# Patient Record
Sex: FEMALE | Race: WHITE | NOT HISPANIC OR LATINO | Employment: OTHER | URBAN - METROPOLITAN AREA
[De-identification: names, ages, dates, MRNs, and addresses within clinical notes are randomized per-mention and may not be internally consistent; named-entity substitution may affect disease eponyms.]

---

## 2023-05-02 ENCOUNTER — TELEPHONE (OUTPATIENT)
Dept: PRIMARY CARE | Facility: CLINIC | Age: 73
End: 2023-05-02
Payer: MEDICARE

## 2023-05-03 ENCOUNTER — TELEPHONE (OUTPATIENT)
Dept: PRIMARY CARE | Facility: CLINIC | Age: 73
End: 2023-05-03
Payer: MEDICARE

## 2023-05-03 NOTE — TELEPHONE ENCOUNTER
Called again to discussed results with patient  Reassured about her coronary artery calcium score being 0 and no other significant findings other than some mitral annular calcification which can just be followed in office with cardiac auscultation and she should be mindful of any potential increased shortness of breath with activity  Also, her bone density test did show improvement when compared to her last test, but still behind compared to her baseline  However if she is trending in the right direction on her alendronate and tolerating that well  Would encourage her to continue as doing and we will plan to recheck in another 2 years  Also mammogram is normal  Also she asked us to keep an eye out for the Pap test she had done by GYN  -I sent a reminder

## 2023-06-02 ENCOUNTER — TELEPHONE (OUTPATIENT)
Dept: PRIMARY CARE | Facility: CLINIC | Age: 73
End: 2023-06-02
Payer: MEDICARE

## 2023-06-02 DIAGNOSIS — H10.9 CONJUNCTIVITIS, UNSPECIFIED CONJUNCTIVITIS TYPE, UNSPECIFIED LATERALITY: Primary | ICD-10-CM

## 2023-06-02 RX ORDER — PREDNISOLONE ACETATE 10 MG/ML
1 SUSPENSION/ DROPS OPHTHALMIC 4 TIMES DAILY
Qty: 5 ML | Refills: 0 | Status: SHIPPED | OUTPATIENT
Start: 2023-06-02 | End: 2023-06-09

## 2023-06-02 RX ORDER — TOBRAMYCIN 3 MG/ML
2 SOLUTION/ DROPS OPHTHALMIC 4 TIMES DAILY
Qty: 5 ML | Refills: 0 | Status: SHIPPED | OUTPATIENT
Start: 2023-06-02 | End: 2023-06-09

## 2023-06-02 NOTE — TELEPHONE ENCOUNTER
Called and discussed with patient  She had sent email images of right eye irritation  Denies any fever chills or any other upper respiratory symptoms of note  She has not tried putting anything in the eye, denies any trauma to the  Was around her grandchildren who have some upper respiratory symptoms  Given the story and current condition, will try tobramycin drops along with steroid  She denies any history of increased intraocular pressure or glaucoma  If worsening symptoms through the weekend to update us, also if starts to get symptoms into the left eye to go ahead and treat that similarly  Also noted she does not need to take a full 7-day course nor does she need to add the prednisolone unless she really is feeling some discomfort/irritation    Requested Prescriptions     Signed Prescriptions Disp Refills    tobramycin (Tobrex) 0.3 % ophthalmic solution 5 mL 0     Sig: Administer 2 drops into the right eye 4 times a day for 7 days. May use with prednisolone     Authorizing Provider: MITCHELL SARKAR    prednisoLONE acetate (Pred Forte) 1 % ophthalmic suspension 5 mL 0     Sig: Administer 1 drop into the right eye 4 times a day for 7 days. May use with Tobramycin     Authorizing Provider: MITCHELL SARKAR

## 2023-07-03 ENCOUNTER — TELEPHONE (OUTPATIENT)
Dept: PRIMARY CARE | Facility: CLINIC | Age: 73
End: 2023-07-03
Payer: MEDICARE

## 2023-07-03 NOTE — TELEPHONE ENCOUNTER
Patient had asked us to keep an eye out fro her Pap/HPV testing through Gyn office - wasn't able to easily view until now - did have positive HPV - sent the following email...    Nito Borden - I hope this finds you enjoying summer.  In short, I had a note to myself to keep an eye out for your Pap and HPV testing done in late April. I had checked a few times, but finally I was able to access it. I assume you had ultimately heard about it from Dr Lopez, but let me know if you hadn't.  Best regards - TP

## 2023-07-04 NOTE — TELEPHONE ENCOUNTER
Patient emailed me after getting direction from her GYN to do colposcopy at her next visit and spring of next year  She does not have any symptoms and her Pap test was normal  I am not aware of any new guidance in that area nor that there is clear benefit of getting an HPV vaccine to thwart any potential complications from the presence of the virus  Encouraged her to reach out if any questions or concerns in the interim

## 2023-12-15 DIAGNOSIS — Z00.00 HEALTHCARE MAINTENANCE: ICD-10-CM

## 2023-12-20 DIAGNOSIS — Z00.00 HEALTHCARE MAINTENANCE: ICD-10-CM

## 2024-01-06 ASSESSMENT — PROMIS GLOBAL HEALTH SCALE
RATE_PHYSICAL_HEALTH: VERY GOOD
EMOTIONAL_PROBLEMS: RARELY
RATE_AVERAGE_PAIN: 2
RATE_AVERAGE_FATIGUE: MILD
CARRYOUT_PHYSICAL_ACTIVITIES: COMPLETELY
RATE_QUALITY_OF_LIFE: VERY GOOD
RATE_GENERAL_HEALTH: VERY GOOD
RATE_MENTAL_HEALTH: VERY GOOD
RATE_SOCIAL_SATISFACTION: VERY GOOD
CARRYOUT_SOCIAL_ACTIVITIES: EXCELLENT

## 2024-01-08 ENCOUNTER — LAB (OUTPATIENT)
Dept: LAB | Facility: LAB | Age: 74
End: 2024-01-08
Payer: MEDICARE

## 2024-01-08 DIAGNOSIS — Z00.00 HEALTHCARE MAINTENANCE: ICD-10-CM

## 2024-01-08 LAB
25(OH)D3 SERPL-MCNC: 93 NG/ML (ref 30–100)
ALBUMIN SERPL BCP-MCNC: 4.2 G/DL (ref 3.4–5)
ALP SERPL-CCNC: 42 U/L (ref 33–136)
ALT SERPL W P-5'-P-CCNC: 8 U/L (ref 7–45)
ANION GAP SERPL CALC-SCNC: 13 MMOL/L (ref 10–20)
APPEARANCE UR: CLEAR
AST SERPL W P-5'-P-CCNC: 18 U/L (ref 9–39)
BASOPHILS # BLD AUTO: 0.05 X10*3/UL (ref 0–0.1)
BASOPHILS NFR BLD AUTO: 0.7 %
BILIRUB SERPL-MCNC: 0.7 MG/DL (ref 0–1.2)
BILIRUB UR STRIP.AUTO-MCNC: NEGATIVE MG/DL
BUN SERPL-MCNC: 17 MG/DL (ref 6–23)
CALCIUM SERPL-MCNC: 9.5 MG/DL (ref 8.6–10.6)
CHLORIDE SERPL-SCNC: 104 MMOL/L (ref 98–107)
CHOLEST SERPL-MCNC: 226 MG/DL (ref 0–199)
CHOLESTEROL/HDL RATIO: 2.7
CO2 SERPL-SCNC: 29 MMOL/L (ref 21–32)
COLOR UR: YELLOW
CREAT SERPL-MCNC: 0.72 MG/DL (ref 0.5–1.05)
CRP SERPL HS-MCNC: 0.7 MG/L
EGFRCR SERPLBLD CKD-EPI 2021: 87 ML/MIN/1.73M*2
EOSINOPHIL # BLD AUTO: 0.13 X10*3/UL (ref 0–0.4)
EOSINOPHIL NFR BLD AUTO: 1.9 %
ERYTHROCYTE [DISTWIDTH] IN BLOOD BY AUTOMATED COUNT: 13.1 % (ref 11.5–14.5)
EST. AVERAGE GLUCOSE BLD GHB EST-MCNC: 105 MG/DL
GLUCOSE SERPL-MCNC: 84 MG/DL (ref 74–99)
GLUCOSE UR STRIP.AUTO-MCNC: NEGATIVE MG/DL
HBA1C MFR BLD: 5.3 %
HCT VFR BLD AUTO: 42 % (ref 36–46)
HDLC SERPL-MCNC: 84.3 MG/DL
HGB BLD-MCNC: 13.9 G/DL (ref 12–16)
IMM GRANULOCYTES # BLD AUTO: 0.02 X10*3/UL (ref 0–0.5)
IMM GRANULOCYTES NFR BLD AUTO: 0.3 % (ref 0–0.9)
KETONES UR STRIP.AUTO-MCNC: NEGATIVE MG/DL
LDLC SERPL CALC-MCNC: 124 MG/DL
LEUKOCYTE ESTERASE UR QL STRIP.AUTO: NEGATIVE
LYMPHOCYTES # BLD AUTO: 1.91 X10*3/UL (ref 0.8–3)
LYMPHOCYTES NFR BLD AUTO: 28.4 %
MCH RBC QN AUTO: 31 PG (ref 26–34)
MCHC RBC AUTO-ENTMCNC: 33.1 G/DL (ref 32–36)
MCV RBC AUTO: 94 FL (ref 80–100)
MONOCYTES # BLD AUTO: 0.52 X10*3/UL (ref 0.05–0.8)
MONOCYTES NFR BLD AUTO: 7.7 %
NEUTROPHILS # BLD AUTO: 4.09 X10*3/UL (ref 1.6–5.5)
NEUTROPHILS NFR BLD AUTO: 61 %
NITRITE UR QL STRIP.AUTO: NEGATIVE
NON HDL CHOLESTEROL: 142 MG/DL (ref 0–149)
NRBC BLD-RTO: 0 /100 WBCS (ref 0–0)
PH UR STRIP.AUTO: 5 [PH]
PLATELET # BLD AUTO: 249 X10*3/UL (ref 150–450)
POTASSIUM SERPL-SCNC: 4 MMOL/L (ref 3.5–5.3)
PROT SERPL-MCNC: 6.2 G/DL (ref 6.4–8.2)
PROT UR STRIP.AUTO-MCNC: NEGATIVE MG/DL
RBC # BLD AUTO: 4.48 X10*6/UL (ref 4–5.2)
RBC # UR STRIP.AUTO: NEGATIVE /UL
SODIUM SERPL-SCNC: 142 MMOL/L (ref 136–145)
SP GR UR STRIP.AUTO: 1.02
TRIGL SERPL-MCNC: 87 MG/DL (ref 0–149)
TSH SERPL-ACNC: 2.58 MIU/L (ref 0.44–3.98)
UROBILINOGEN UR STRIP.AUTO-MCNC: <2 MG/DL
VLDL: 17 MG/DL (ref 0–40)
WBC # BLD AUTO: 6.7 X10*3/UL (ref 4.4–11.3)

## 2024-01-08 PROCEDURE — 80053 COMPREHEN METABOLIC PANEL: CPT

## 2024-01-08 PROCEDURE — 85025 COMPLETE CBC W/AUTO DIFF WBC: CPT

## 2024-01-08 PROCEDURE — 36415 COLL VENOUS BLD VENIPUNCTURE: CPT

## 2024-01-08 PROCEDURE — 84443 ASSAY THYROID STIM HORMONE: CPT

## 2024-01-08 PROCEDURE — 82306 VITAMIN D 25 HYDROXY: CPT

## 2024-01-08 PROCEDURE — 83036 HEMOGLOBIN GLYCOSYLATED A1C: CPT

## 2024-01-08 PROCEDURE — 81003 URINALYSIS AUTO W/O SCOPE: CPT

## 2024-01-08 PROCEDURE — 86141 C-REACTIVE PROTEIN HS: CPT

## 2024-01-08 PROCEDURE — 80061 LIPID PANEL: CPT

## 2024-01-11 ENCOUNTER — OFFICE VISIT (OUTPATIENT)
Dept: PRIMARY CARE | Facility: CLINIC | Age: 74
End: 2024-01-11
Payer: MEDICARE

## 2024-01-11 VITALS
WEIGHT: 98.2 LBS | TEMPERATURE: 98.9 F | SYSTOLIC BLOOD PRESSURE: 118 MMHG | HEIGHT: 59 IN | BODY MASS INDEX: 19.8 KG/M2 | HEART RATE: 84 BPM | DIASTOLIC BLOOD PRESSURE: 78 MMHG | OXYGEN SATURATION: 97 %

## 2024-01-11 DIAGNOSIS — B97.7 HIGH RISK HPV INFECTION: ICD-10-CM

## 2024-01-11 DIAGNOSIS — M81.0 OSTEOPOROSIS WITHOUT CURRENT PATHOLOGICAL FRACTURE, UNSPECIFIED OSTEOPOROSIS TYPE: ICD-10-CM

## 2024-01-11 DIAGNOSIS — Z00.01 ENCOUNTER FOR GENERAL ADULT MEDICAL EXAMINATION WITH ABNORMAL FINDINGS: Primary | ICD-10-CM

## 2024-01-11 DIAGNOSIS — E67.3 HIGH VITAMIN D LEVEL: ICD-10-CM

## 2024-01-11 DIAGNOSIS — Z12.11 COLON CANCER SCREENING: ICD-10-CM

## 2024-01-11 PROBLEM — J30.9 ALLERGIC RHINITIS: Status: ACTIVE | Noted: 2024-01-11

## 2024-01-11 PROBLEM — R87.619 ABNORMAL PAP SMEAR OF CERVIX: Status: ACTIVE | Noted: 2024-01-11

## 2024-01-11 PROBLEM — M85.80 OSTEOPENIA: Status: ACTIVE | Noted: 2024-01-11

## 2024-01-11 PROCEDURE — 1036F TOBACCO NON-USER: CPT | Performed by: FAMILY MEDICINE

## 2024-01-11 PROCEDURE — UHSPHYS PR UH SELECT PHYSICAL: Performed by: FAMILY MEDICINE

## 2024-01-11 PROCEDURE — 1126F AMNT PAIN NOTED NONE PRSNT: CPT | Performed by: FAMILY MEDICINE

## 2024-01-11 PROCEDURE — 1159F MED LIST DOCD IN RCRD: CPT | Performed by: FAMILY MEDICINE

## 2024-01-11 RX ORDER — ACETAMINOPHEN 500 MG
TABLET ORAL
COMMUNITY
Start: 2023-01-17 | End: 2024-01-11 | Stop reason: DRUGHIGH

## 2024-01-11 RX ORDER — ALENDRONATE SODIUM 70 MG/1
TABLET ORAL
COMMUNITY
End: 2024-04-29 | Stop reason: SDUPTHER

## 2024-01-11 RX ORDER — ACETAMINOPHEN 500 MG
2000 TABLET ORAL
Qty: 1 CAPSULE | Refills: 0 | Status: SHIPPED | COMMUNITY
Start: 2024-01-12 | End: 2024-04-29 | Stop reason: WASHOUT

## 2024-01-11 RX ORDER — ELECTROLYTES/DEXTROSE
SOLUTION, ORAL ORAL
COMMUNITY
Start: 2023-01-17

## 2024-01-11 RX ORDER — LORATADINE 10 MG/1
1 TABLET ORAL DAILY PRN
COMMUNITY
Start: 2023-01-17

## 2024-01-11 RX ORDER — PNV NO.95/FERROUS FUM/FOLIC AC 28MG-0.8MG
1 TABLET ORAL DAILY
COMMUNITY
Start: 2023-01-17

## 2024-01-11 ASSESSMENT — ENCOUNTER SYMPTOMS
LOSS OF SENSATION IN FEET: 0
DEPRESSION: 0
OCCASIONAL FEELINGS OF UNSTEADINESS: 0

## 2024-01-11 ASSESSMENT — PATIENT HEALTH QUESTIONNAIRE - PHQ9
2. FEELING DOWN, DEPRESSED OR HOPELESS: NOT AT ALL
SUM OF ALL RESPONSES TO PHQ9 QUESTIONS 1 & 2: 0
1. LITTLE INTEREST OR PLEASURE IN DOING THINGS: NOT AT ALL

## 2024-01-11 ASSESSMENT — LIFESTYLE VARIABLES
HOW OFTEN DO YOU HAVE A DRINK CONTAINING ALCOHOL: NEVER
HOW MANY STANDARD DRINKS CONTAINING ALCOHOL DO YOU HAVE ON A TYPICAL DAY: PATIENT DOES NOT DRINK
SKIP TO QUESTIONS 9-10: 1
AUDIT-C TOTAL SCORE: 0
HOW OFTEN DO YOU HAVE SIX OR MORE DRINKS ON ONE OCCASION: NEVER

## 2024-01-11 ASSESSMENT — PAIN SCALES - GENERAL: PAINLEVEL: 0-NO PAIN

## 2024-01-11 NOTE — PROGRESS NOTES
"Subjective   Patient ID: Suha Arellano is a 73 y.o. female who presents for Annual Exam ([facility]).  HPI  Doing well overall     1) uncertain as to what follow-up she needs for her HPV positivity with normal Pap at last visit in Spring 2023 - has had colpo in past - question if should simply have repeat HPV and Pap or other - she is not having any pelvic symptoms    2) preventive care -   Heart disease risk - CAC was 0 last year - has good TC:HDL - great BP - no DM - follow  Osteoporosis - BMD due 4/2025  Mammo - due 4/2025  Colon cancer - last 8/23/2013  Dr Bryant at [facility]     Review of Systems  Essentially noncontributory otherwise    Objective   /78 (BP Location: Left arm, Patient Position: Sitting, BP Cuff Size: Adult)   Pulse 84   Temp 37.2 °C (98.9 °F) (Temporal)   Ht 1.51 m (4' 11.45\")   Wt (!) 44.5 kg (98 lb 3.2 oz)   SpO2 97%   BMI 19.54 kg/m²     Physical Exam  Vitals and nursing note reviewed.   Constitutional:       General: She is not in acute distress.     Appearance: She is not ill-appearing.   HENT:      Head: Normocephalic and atraumatic.      Right Ear: Tympanic membrane normal.      Left Ear: Tympanic membrane normal.      Mouth/Throat:      Pharynx: No posterior oropharyngeal erythema.   Eyes:      General: No scleral icterus.     Extraocular Movements: Extraocular movements intact.      Pupils: Pupils are equal, round, and reactive to light.   Cardiovascular:      Rate and Rhythm: Normal rate and regular rhythm.      Heart sounds: No murmur heard.  Pulmonary:      Breath sounds: Normal breath sounds. No wheezing or rhonchi.   Abdominal:      General: There is no distension.      Palpations: Abdomen is soft.      Tenderness: There is no abdominal tenderness.   Musculoskeletal:         General: Normal range of motion.      Cervical back: Normal range of motion.      Right lower leg: No edema.      Left lower leg: No edema.   Lymphadenopathy:      Cervical: No cervical " adenopathy.   Skin:     General: Skin is warm and dry.   Neurological:      Mental Status: She is alert and oriented to person, place, and time. Mental status is at baseline.      Motor: No weakness.      Coordination: Coordination normal.      Deep Tendon Reflexes: Reflexes normal.   Psychiatric:         Mood and Affect: Mood normal.         Behavior: Behavior normal.         Thought Content: Thought content normal.         Judgment: Judgment normal.         Assessment/Plan   Problem List Items Addressed This Visit       High risk HPV infection    Osteoporosis     Other Visit Diagnoses       Encounter for general adult medical examination with abnormal findings    -  Primary    High vitamin D level        Colon cancer screening            1) will message Dr Lopez about follow-up    2) reduce Vit D dosing as noted - BMD next year     3) consider c-scope v Cologuard testing

## 2024-04-29 ENCOUNTER — OFFICE VISIT (OUTPATIENT)
Dept: OBSTETRICS AND GYNECOLOGY | Facility: CLINIC | Age: 74
End: 2024-04-29
Payer: MEDICARE

## 2024-04-29 VITALS
BODY MASS INDEX: 19.63 KG/M2 | WEIGHT: 100 LBS | HEIGHT: 60 IN | DIASTOLIC BLOOD PRESSURE: 77 MMHG | SYSTOLIC BLOOD PRESSURE: 154 MMHG

## 2024-04-29 DIAGNOSIS — Z12.31 BREAST CANCER SCREENING BY MAMMOGRAM: ICD-10-CM

## 2024-04-29 DIAGNOSIS — Z01.419 WELL WOMAN EXAM: Primary | ICD-10-CM

## 2024-04-29 DIAGNOSIS — M81.0 AGE-RELATED OSTEOPOROSIS WITHOUT CURRENT PATHOLOGICAL FRACTURE: ICD-10-CM

## 2024-04-29 DIAGNOSIS — Z12.4 CERVICAL CANCER SCREENING: ICD-10-CM

## 2024-04-29 DIAGNOSIS — R87.618 OTHER ABNORMAL CYTOLOGICAL FINDING OF SPECIMEN FROM CERVIX: ICD-10-CM

## 2024-04-29 PROCEDURE — 1160F RVW MEDS BY RX/DR IN RCRD: CPT | Performed by: OBSTETRICS & GYNECOLOGY

## 2024-04-29 PROCEDURE — 1126F AMNT PAIN NOTED NONE PRSNT: CPT | Performed by: OBSTETRICS & GYNECOLOGY

## 2024-04-29 PROCEDURE — 87624 HPV HI-RISK TYP POOLED RSLT: CPT

## 2024-04-29 PROCEDURE — 1159F MED LIST DOCD IN RCRD: CPT | Performed by: OBSTETRICS & GYNECOLOGY

## 2024-04-29 PROCEDURE — G0101 CA SCREEN;PELVIC/BREAST EXAM: HCPCS | Performed by: OBSTETRICS & GYNECOLOGY

## 2024-04-29 PROCEDURE — 1036F TOBACCO NON-USER: CPT | Performed by: OBSTETRICS & GYNECOLOGY

## 2024-04-29 PROCEDURE — 88175 CYTOPATH C/V AUTO FLUID REDO: CPT

## 2024-04-29 RX ORDER — ALENDRONATE SODIUM 70 MG/1
TABLET ORAL
Qty: 12 TABLET | Refills: 3 | Status: SHIPPED | OUTPATIENT
Start: 2024-04-29

## 2024-04-29 ASSESSMENT — ENCOUNTER SYMPTOMS
UNEXPECTED WEIGHT CHANGE: 0
FEVER: 0
FLANK PAIN: 0
COLOR CHANGE: 0
BACK PAIN: 0
ABDOMINAL PAIN: 0
CHILLS: 0
VOMITING: 0
SHORTNESS OF BREATH: 0
DIARRHEA: 0
FATIGUE: 0
DYSURIA: 0
CONSTIPATION: 0
NAUSEA: 0
BLOOD IN STOOL: 0
HEMATURIA: 0
ABDOMINAL DISTENTION: 0
SLEEP DISTURBANCE: 0
FREQUENCY: 0
APPETITE CHANGE: 0

## 2024-04-29 ASSESSMENT — PAIN SCALES - GENERAL: PAINLEVEL: 0-NO PAIN

## 2024-04-29 NOTE — PROGRESS NOTES
History Of Present Illness  Routine Gyn Exam  Suhaalejo Arellano here for routine WWE and 1 year follow up of abnormal pap smear   Pt is postmenopausal.  Denies spotting or bleeding.       Concerns: none .     New health issues or surgeries since last visit: denies.  Exercise:  pickle ball, walking .     Gynecologic History  Postmenopausal.  Sexually active: not currently .  Last Pap:  - NILM, HPV other+.   Last mammogram: .   Last Colonoscopy:  .   Last DEXA:  - osteopenia, history of osteoporosis and currently on alendronate.     Obstetric History  OB History    Para Term  AB Living   3         3   SAB IAB Ectopic Multiple Live Births                  # Outcome Date GA Lbr Alan/2nd Weight Sex Delivery Anes PTL Lv   3             2             1                  Review of Systems   Constitutional:  Negative for appetite change, chills, fatigue, fever and unexpected weight change.   Respiratory:  Negative for shortness of breath.    Cardiovascular:  Negative for chest pain.   Gastrointestinal:  Negative for abdominal distention, abdominal pain, blood in stool, constipation, diarrhea, nausea and vomiting.   Endocrine: Negative for cold intolerance and heat intolerance.   Genitourinary:  Negative for dyspareunia, dysuria, flank pain, frequency, genital sores, hematuria, menstrual problem, pelvic pain, urgency, vaginal bleeding, vaginal discharge and vaginal pain.   Musculoskeletal:  Negative for back pain.   Skin:  Negative for color change.   Psychiatric/Behavioral:  Negative for sleep disturbance.        /77 (BP Location: Left arm, Patient Position: Sitting)   Ht 1.524 m (5')   Wt 45.4 kg (100 lb)   LMP  (LMP Unknown)   BMI 19.53 kg/m²      Physical Exam  Constitutional:       Appearance: Normal appearance.   HENT:      Head: Normocephalic and atraumatic.   Chest:   Breasts:     Right: Normal.      Left: Normal.   Abdominal:      General: Abdomen is flat.       Palpations: Abdomen is soft.      Tenderness: There is no abdominal tenderness.   Genitourinary:     General: Normal vulva.      Vagina: Normal.      Cervix: Normal.      Uterus: Normal.       Adnexa: Right adnexa normal and left adnexa normal.   Skin:     General: Skin is warm and dry.   Neurological:      Mental Status: She is alert and oriented to person, place, and time.   Psychiatric:         Mood and Affect: Mood normal.              Assessment/Plan         Routine Well Woman Exam Today and follow up for abnormal pap smear   Discussed diet and exercise.   Reviewed routine health screenings.   Pap: pap done today   Recommend annual mammograms. Mammogram ordered and patient will schedule.                  Mady Lopez MD

## 2024-05-09 ENCOUNTER — HOSPITAL ENCOUNTER (OUTPATIENT)
Dept: RADIOLOGY | Facility: CLINIC | Age: 74
Discharge: HOME | End: 2024-05-09
Payer: MEDICARE

## 2024-05-09 VITALS — BODY MASS INDEX: 19.65 KG/M2 | HEIGHT: 60 IN | WEIGHT: 100.09 LBS

## 2024-05-09 DIAGNOSIS — Z12.31 BREAST CANCER SCREENING BY MAMMOGRAM: ICD-10-CM

## 2024-05-09 PROCEDURE — 77067 SCR MAMMO BI INCL CAD: CPT

## 2024-05-09 PROCEDURE — 77063 BREAST TOMOSYNTHESIS BI: CPT | Performed by: STUDENT IN AN ORGANIZED HEALTH CARE EDUCATION/TRAINING PROGRAM

## 2024-05-09 PROCEDURE — 77067 SCR MAMMO BI INCL CAD: CPT | Performed by: STUDENT IN AN ORGANIZED HEALTH CARE EDUCATION/TRAINING PROGRAM

## 2024-05-13 LAB
CYTOLOGY CMNT CVX/VAG CYTO-IMP: NORMAL
HPV HR 12 DNA GENITAL QL NAA+PROBE: POSITIVE
HPV HR GENOTYPES PNL CVX NAA+PROBE: POSITIVE
HPV16 DNA SPEC QL NAA+PROBE: NEGATIVE
HPV18 DNA SPEC QL NAA+PROBE: NEGATIVE
LAB AP HPV GENOTYPE QUESTION: YES
LAB AP HPV HR: NORMAL
LABORATORY COMMENT REPORT: NORMAL
PATH REPORT.TOTAL CANCER: NORMAL

## 2024-05-22 ENCOUNTER — TELEPHONE (OUTPATIENT)
Dept: OBSTETRICS AND GYNECOLOGY | Facility: CLINIC | Age: 74
End: 2024-05-22
Payer: MEDICARE

## 2024-05-22 NOTE — TELEPHONE ENCOUNTER
Called patient to discuss results  Left vm for patient to return call.    ANOOP Headley RN      ----- Message from Mady STONE MD sent at 5/22/2024  3:18 PM EDT -----  Pt continues to have HPV+ pap smear. Please notify patient.  I recommend follow up with colposcopy at her convenience. Thank you.

## 2024-05-23 ENCOUNTER — TELEPHONE (OUTPATIENT)
Dept: OBSTETRICS AND GYNECOLOGY | Facility: CLINIC | Age: 74
End: 2024-05-23
Payer: MEDICARE

## 2024-05-23 NOTE — TELEPHONE ENCOUNTER
Called patient to discuss results and schedule follow up   Identified by name and   Patient notified that pap came back with +HPV  Patient has had a colposcopy in the past and denies need for further education  Patient is out of town a the moment and would like to call back to schedule when she is back. Will call the office to schedule.  Encouraged patient to call office with any questions or concerns     ANOOP Headley RN      ----- Message from Mady STONE MD sent at 2024  3:18 PM EDT -----  Pt continues to have HPV+ pap smear. Please notify patient.  I recommend follow up with colposcopy at her convenience. Thank you.

## 2024-08-16 ENCOUNTER — APPOINTMENT (OUTPATIENT)
Dept: OBSTETRICS AND GYNECOLOGY | Facility: CLINIC | Age: 74
End: 2024-08-16
Payer: MEDICARE

## 2024-08-16 VITALS
WEIGHT: 98.6 LBS | HEIGHT: 60 IN | DIASTOLIC BLOOD PRESSURE: 62 MMHG | SYSTOLIC BLOOD PRESSURE: 110 MMHG | BODY MASS INDEX: 19.36 KG/M2

## 2024-08-16 DIAGNOSIS — R87.618 OTHER ABNORMAL CYTOLOGICAL FINDING OF SPECIMEN FROM CERVIX: Primary | ICD-10-CM

## 2024-08-16 PROCEDURE — 57456 ENDOCERV CURETTAGE W/SCOPE: CPT | Performed by: OBSTETRICS & GYNECOLOGY

## 2024-08-16 PROCEDURE — 88305 TISSUE EXAM BY PATHOLOGIST: CPT

## 2024-08-16 ASSESSMENT — PAIN SCALES - GENERAL: PAINLEVEL: 0-NO PAIN

## 2024-08-16 NOTE — PROGRESS NOTES
Patient ID: Suha Arellano is a 74 y.o. female.  Pt has several year history of HPV other+ pap smear with no evidence of dysplasia.   Last pap NILM, HPV other+    Visit Vitals  /62 (BP Location: Left arm, Patient Position: Sitting)   Ht 1.524 m (5')   Wt (!) 44.7 kg (98 lb 9.6 oz)   LMP  (LMP Unknown)   BMI 19.26 kg/m²   OB Status Postmenopausal   Smoking Status Never   BSA 1.38 m²         Colposcopy    Date/Time: 8/16/2024 9:44 AM    Performed by: Mady STONE MD  Authorized by: Mady STONE MD    Procedure location: cervix    Consent:     Patient questions answered: yes      Risks and benefits of the procedure and its alternatives discussed: yes      Procedural risks discussed:  Bleeding, infection, failure rate and repeat procedure    Consent obtained:  Verbal    Consent given by:  Patient  Universal Protocol:     A time out verifies correct patient, procedure, equipment, support staff, and site/side marked as required: yes      Patient states understanding of procedure being performed: yes      Relevant documents present and verified: yes      Test results available and properly labeled: yes    Indication:     Cervical indication(s): high-risk HPV positive    Pre-procedure:     Speculum was placed in the vagina: yes      Prep solution(s): acetic acid    Procedure:     Colposcopy with: endocervical curettage      Cervix visibility: fully visualized      SCJ visibility: not fully visualized      Cervical impression: normal/benign    Post-procedure:     Patient tolerance of procedure:  Patient tolerated the procedure well with no immediate complications      Colposcopy completed today   Results will take 5-7 days and patient aware I will follow up with them regarding results either by phone or [employer] messaging.   Pt instructed she may have some cramping and/or bleeding for the next 1-2 days. Please use  tylenol or ibuprofen as needed for discomfort.   Pt instructed to avoid vaginal intercourse for  1 night.  No intense exercise today but normal activities are acceptable.  May resume all activities tomorrow.

## 2024-08-23 LAB
LABORATORY COMMENT REPORT: NORMAL
PATH REPORT.COMMENTS IMP SPEC: NORMAL
PATH REPORT.FINAL DX SPEC: NORMAL
PATH REPORT.GROSS SPEC: NORMAL
PATH REPORT.RELEVANT HX SPEC: NORMAL
PATH REPORT.TOTAL CANCER: NORMAL

## 2024-09-05 ENCOUNTER — TELEPHONE (OUTPATIENT)
Dept: OBSTETRICS AND GYNECOLOGY | Facility: CLINIC | Age: 74
End: 2024-09-05
Payer: MEDICARE

## 2024-09-05 NOTE — TELEPHONE ENCOUNTER
patient is calling the provider back in regards to test results. In the event the call is missed you may leave a detailed message but patient would like to speak with provider instead.

## 2024-09-10 ENCOUNTER — PREP FOR PROCEDURE (OUTPATIENT)
Dept: OBSTETRICS AND GYNECOLOGY | Facility: CLINIC | Age: 74
End: 2024-09-10
Payer: MEDICARE

## 2024-09-10 DIAGNOSIS — N87.1 MODERATE DYSPLASIA OF CERVIX: Primary | ICD-10-CM

## 2024-09-10 RX ORDER — SODIUM CHLORIDE, SODIUM LACTATE, POTASSIUM CHLORIDE, CALCIUM CHLORIDE 600; 310; 30; 20 MG/100ML; MG/100ML; MG/100ML; MG/100ML
20 INJECTION, SOLUTION INTRAVENOUS CONTINUOUS
OUTPATIENT
Start: 2024-09-10

## 2024-10-10 ENCOUNTER — APPOINTMENT (OUTPATIENT)
Dept: OBSTETRICS AND GYNECOLOGY | Facility: CLINIC | Age: 74
End: 2024-10-10
Payer: MEDICARE

## 2024-10-10 ENCOUNTER — PREP FOR PROCEDURE (OUTPATIENT)
Dept: OBSTETRICS AND GYNECOLOGY | Facility: CLINIC | Age: 74
End: 2024-10-10

## 2024-10-10 VITALS
BODY MASS INDEX: 19.83 KG/M2 | DIASTOLIC BLOOD PRESSURE: 68 MMHG | SYSTOLIC BLOOD PRESSURE: 102 MMHG | HEIGHT: 60 IN | WEIGHT: 101 LBS

## 2024-10-10 DIAGNOSIS — N87.9 CERVICAL DYSPLASIA: Primary | ICD-10-CM

## 2024-10-10 PROCEDURE — 1126F AMNT PAIN NOTED NONE PRSNT: CPT | Performed by: OBSTETRICS & GYNECOLOGY

## 2024-10-10 PROCEDURE — 1160F RVW MEDS BY RX/DR IN RCRD: CPT | Performed by: OBSTETRICS & GYNECOLOGY

## 2024-10-10 PROCEDURE — 1159F MED LIST DOCD IN RCRD: CPT | Performed by: OBSTETRICS & GYNECOLOGY

## 2024-10-10 PROCEDURE — 99213 OFFICE O/P EST LOW 20 MIN: CPT | Performed by: OBSTETRICS & GYNECOLOGY

## 2024-10-10 PROCEDURE — 1036F TOBACCO NON-USER: CPT | Performed by: OBSTETRICS & GYNECOLOGY

## 2024-10-10 ASSESSMENT — PAIN SCALES - GENERAL: PAINLEVEL: 0-NO PAIN

## 2024-10-10 ASSESSMENT — ENCOUNTER SYMPTOMS
VOMITING: 0
APPETITE CHANGE: 0
NAUSEA: 0
FREQUENCY: 0
ABDOMINAL PAIN: 0
DYSURIA: 0
FLANK PAIN: 0
DIARRHEA: 0
SHORTNESS OF BREATH: 0
BACK PAIN: 0
SLEEP DISTURBANCE: 0
CHILLS: 0
UNEXPECTED WEIGHT CHANGE: 0
COLOR CHANGE: 0
FEVER: 0
BLOOD IN STOOL: 0
FATIGUE: 0
HEMATURIA: 0
ABDOMINAL DISTENTION: 0
CONSTIPATION: 0

## 2024-10-10 NOTE — H&P (VIEW-ONLY)
Suha Arellano is a 74 y.o.  here for pre op visit    Pt is scheduled for LEEP.  Pt has several year history of HPV+ pap and most recent colposcopic biopsy showed possible intraepithelial lesion (ECC : Scant squamous epithelium with findings suggestive of but not diagnostic for low-grade squamous intraepithelial lesion)      Menstrual cycles: menopausal   Sexually active: not currently      Past medical and surgical history reviewed.   Pt has had several surgeries in the past, never had any problems with anesthesia.     Obstetric History  OB History    Para Term  AB Living   3 3 3     3   SAB IAB Ectopic Multiple Live Births                  # Outcome Date GA Lbr Alan/2nd Weight Sex Type Anes PTL Lv   3 Term            2 Term            1 Term                 Past Medical History  She has a past medical history of Abnormal Pap smear of cervix.    She has no past medical history of ADD (attention deficit disorder), ADHD (attention deficit hyperactivity disorder), FABIANA (acute kidney injury) (CMS-HCC), Anxiety, Arthritis, Autism ([facility]), Autoimmune disorder (Multi), Biliary disease, Bipolar disorder, Bladder cancer (Multi), BPH (benign prostatic hyperplasia), Cancer of neurologic system ([facility]), Celiac disease ([facility]), Cerebral aneurysm ([facility]), Cerebral palsy, Cerebral vascular accident (Multi), Cervical disc disease, Cholelithiasis, Chronic headaches, Chronic kidney disease, Chronic pain disorder, Cirrhosis (Multi), CKD (chronic kidney disease), Cognitive decline, Cognitive disorder, Colon polyp, Colorectal cancer (Multi), Crohn's disease (Multi), Dementia, Depression, Diverticulosis, Dizziness, Dysfunctional uterine bleeding, Dysphagia, Endometrial cancer (Multi), Esophageal cancer (Multi), Esophageal disease, ESRD (end stage renal disease) (Multi), Fibroid, Fibromyalgia, primary, Fractures, Gastric cancer (Multi), Gender dysphoria, GERD (gastroesophageal reflux disease), Gestational  diabetes, Gestational hypertension ([facility]), GI (gastrointestinal bleed), Hemodialysis status (CMS-[facility]), Hiatal hernia, History of peritoneal dialysis, HIV disease (Multi), Hydrocephalus, Immunocompromised, Intellectual disability, Irritable bowel syndrome, Joint pain, Liver disease, Lumbar disc disease, Lupus, Mastocytosis, MS (multiple sclerosis) (Multi), Muscular dystrophy (Multi), Myasthenia gravis, Myoneural disorder (Multi), Myotonia, POTTER (nonalcoholic steatohepatitis), Nephrolithiasis, Neuromuscular disorder (Multi), Ovarian cancer (Multi), Pancreatic cancer (Multi), Pancreatitis ([facility]), Pelvic mass, Peptic ulcer disease, Peripheral neuropathy, Personal history of other mental and behavioral disorders, Polycystic ovarian disease, Pregnant ([facility]), Prematurity ([facility]), Prostate cancer (Multi), PTSD (post-traumatic stress disorder), Pyelonephritis, Renal cancer (Multi), Renal disease due to hypertension, Schizophrenia, Scoliosis, Seizure disorder (Multi), Spinal stenosis, Substance addiction (Multi), Thoracic spinal cord injury (Multi), TIA (transient ischemic attack), Ulcerative colitis, Urinary tract infection, Uterine cancer (Multi), or Vertigo.    Surgical History  She has a past surgical history that includes Breast biopsy; Tonsillectomy (08/27/2014); Adenoidectomy; Colonoscopy; Colposcopy; Cataract extraction; and Foot fracture surgery.     Social History  She reports that she has never smoked. She has been exposed to tobacco smoke. She has never used smokeless tobacco. She reports that she does not drink alcohol and does not use drugs.    Family History  Family History   Problem Relation Name Age of Onset    Cancer Sister Peggy Person          /68 (BP Location: Right arm, Patient Position: Sitting)   Ht 1.524 m (5')   Wt 45.8 kg (101 lb)   LMP  (LMP Unknown)   BMI 19.73 kg/m²   No LMP recorded (lmp unknown). Patient is postmenopausal.    Review of Systems   Constitutional:  Negative  for appetite change, chills, fatigue, fever and unexpected weight change.   Respiratory:  Negative for shortness of breath.    Cardiovascular:  Negative for chest pain.   Gastrointestinal:  Negative for abdominal distention, abdominal pain, blood in stool, constipation, diarrhea, nausea and vomiting.   Endocrine: Negative for cold intolerance and heat intolerance.   Genitourinary:  Negative for dyspareunia, dysuria, flank pain, frequency, genital sores, hematuria, menstrual problem, pelvic pain, urgency, vaginal bleeding, vaginal discharge and vaginal pain.   Musculoskeletal:  Negative for back pain.   Skin:  Negative for color change.   Psychiatric/Behavioral:  Negative for sleep disturbance.        Physical Exam  Constitutional:       Appearance: Normal appearance.   Skin:     General: Skin is warm and dry.   Neurological:      Mental Status: She is alert.   Psychiatric:         Mood and Affect: Mood normal.         Behavior: Behavior normal.           Assessment and Plan:    Possible cervical dysplasia - scheduled for LEEP in OR  Discussed LEEP (loop electrosurgical excision procedure) with patient.   The rationale for LEEP was discussed as being two-fold -- both diagnostic to identify any pre-cancerous or cancerous lesions and therapeutic to hopefully remove all abnormal tissue.  The risks and benefits were discussed including the risks of bleeding, infection and injury to adjacent organs, and post-procedure hematometra.  We reviewed the importance of treatment in setting of severe dysplasia, and the potential impact of treatment vs no treatment on her health.  All questions were answered and informed consent for the procedure was obtained.    We also reviewed what to expect post-operatively including vaginal bleeding precautions, pelvic cramping and the need for pelvic rest (nothing in the vagina = tampons, douching, intercourse) for 4 weeks.  The patient will return to clinic in 3-4 weeks after the procedure  for follow-up examination, review of pathology results, and discussion of plan of care.

## 2024-10-10 NOTE — PROGRESS NOTES
Suha Arellano is a 74 y.o.  here for pre op visit    Pt is scheduled for LEEP.  Pt has several year history of HPV+ pap and most recent colposcopic biopsy showed possible intraepithelial lesion (ECC : Scant squamous epithelium with findings suggestive of but not diagnostic for low-grade squamous intraepithelial lesion)      Menstrual cycles: menopausal   Sexually active: not currently      Past medical and surgical history reviewed.   Pt has had several surgeries in the past, never had any problems with anesthesia.     Obstetric History  OB History    Para Term  AB Living   3 3 3     3   SAB IAB Ectopic Multiple Live Births                  # Outcome Date GA Lbr Alan/2nd Weight Sex Type Anes PTL Lv   3 Term            2 Term            1 Term                 Past Medical History  She has a past medical history of Abnormal Pap smear of cervix.    She has no past medical history of ADD (attention deficit disorder), ADHD (attention deficit hyperactivity disorder), FABIANA (acute kidney injury) (CMS-HCC), Anxiety, Arthritis, Autism ([facility]), Autoimmune disorder (Multi), Biliary disease, Bipolar disorder, Bladder cancer (Multi), BPH (benign prostatic hyperplasia), Cancer of neurologic system ([facility]), Celiac disease ([facility]), Cerebral aneurysm ([facility]), Cerebral palsy, Cerebral vascular accident (Multi), Cervical disc disease, Cholelithiasis, Chronic headaches, Chronic kidney disease, Chronic pain disorder, Cirrhosis (Multi), CKD (chronic kidney disease), Cognitive decline, Cognitive disorder, Colon polyp, Colorectal cancer (Multi), Crohn's disease (Multi), Dementia, Depression, Diverticulosis, Dizziness, Dysfunctional uterine bleeding, Dysphagia, Endometrial cancer (Multi), Esophageal cancer (Multi), Esophageal disease, ESRD (end stage renal disease) (Multi), Fibroid, Fibromyalgia, primary, Fractures, Gastric cancer (Multi), Gender dysphoria, GERD (gastroesophageal reflux disease), Gestational  diabetes, Gestational hypertension ([facility]), GI (gastrointestinal bleed), Hemodialysis status ([facility]), Hiatal hernia, History of peritoneal dialysis, HIV disease (Multi), Hydrocephalus, Immunocompromised, Intellectual disability, Irritable bowel syndrome, Joint pain, Liver disease, Lumbar disc disease, Lupus, Mastocytosis, MS (multiple sclerosis) (Multi), Muscular dystrophy (Multi), Myasthenia gravis, Myoneural disorder (Multi), Myotonia, POTTER (nonalcoholic steatohepatitis), Nephrolithiasis, Neuromuscular disorder (Multi), Ovarian cancer (Multi), Pancreatic cancer (Multi), Pancreatitis ([facility]), Pelvic mass, Peptic ulcer disease, Peripheral neuropathy, Personal history of other mental and behavioral disorders, Polycystic ovarian disease, Pregnant ([facility]), Prematurity ([facility]), Prostate cancer (Multi), PTSD (post-traumatic stress disorder), Pyelonephritis, Renal cancer (Multi), Renal disease due to hypertension, Schizophrenia, Scoliosis, Seizure disorder (Multi), Spinal stenosis, Substance addiction (Multi), Thoracic spinal cord injury (Multi), TIA (transient ischemic attack), Ulcerative colitis, Urinary tract infection, Uterine cancer (Multi), or Vertigo.    Surgical History  She has a past surgical history that includes Breast biopsy; Tonsillectomy (08/27/2014); Adenoidectomy; Colonoscopy; Colposcopy; Cataract extraction; and Foot fracture surgery.     Social History  She reports that she has never smoked. She has been exposed to tobacco smoke. She has never used smokeless tobacco. She reports that she does not drink alcohol and does not use drugs.    Family History  Family History   Problem Relation Name Age of Onset    Cancer Sister Peggy Person          /68 (BP Location: Right arm, Patient Position: Sitting)   Ht 1.524 m (5')   Wt 45.8 kg (101 lb)   LMP  (LMP Unknown)   BMI 19.73 kg/m²   No LMP recorded (lmp unknown). Patient is postmenopausal.    Review of Systems   Constitutional:  Negative  for appetite change, chills, fatigue, fever and unexpected weight change.   Respiratory:  Negative for shortness of breath.    Cardiovascular:  Negative for chest pain.   Gastrointestinal:  Negative for abdominal distention, abdominal pain, blood in stool, constipation, diarrhea, nausea and vomiting.   Endocrine: Negative for cold intolerance and heat intolerance.   Genitourinary:  Negative for dyspareunia, dysuria, flank pain, frequency, genital sores, hematuria, menstrual problem, pelvic pain, urgency, vaginal bleeding, vaginal discharge and vaginal pain.   Musculoskeletal:  Negative for back pain.   Skin:  Negative for color change.   Psychiatric/Behavioral:  Negative for sleep disturbance.        Physical Exam  Constitutional:       Appearance: Normal appearance.   Skin:     General: Skin is warm and dry.   Neurological:      Mental Status: She is alert.   Psychiatric:         Mood and Affect: Mood normal.         Behavior: Behavior normal.           Assessment and Plan:    Possible cervical dysplasia - scheduled for LEEP in OR  Discussed LEEP (loop electrosurgical excision procedure) with patient.   The rationale for LEEP was discussed as being two-fold -- both diagnostic to identify any pre-cancerous or cancerous lesions and therapeutic to hopefully remove all abnormal tissue.  The risks and benefits were discussed including the risks of bleeding, infection and injury to adjacent organs, and post-procedure hematometra.  We reviewed the importance of treatment in setting of severe dysplasia, and the potential impact of treatment vs no treatment on her health.  All questions were answered and informed consent for the procedure was obtained.    We also reviewed what to expect post-operatively including vaginal bleeding precautions, pelvic cramping and the need for pelvic rest (nothing in the vagina = tampons, douching, intercourse) for 4 weeks.  The patient will return to clinic in 3-4 weeks after the procedure  for follow-up examination, review of pathology results, and discussion of plan of care.

## 2024-10-22 ENCOUNTER — HOSPITAL ENCOUNTER (OUTPATIENT)
Facility: HOSPITAL | Age: 74
Setting detail: OUTPATIENT SURGERY
Discharge: HOME | End: 2024-10-22
Attending: OBSTETRICS & GYNECOLOGY | Admitting: OBSTETRICS & GYNECOLOGY
Payer: MEDICARE

## 2024-10-22 ENCOUNTER — ANESTHESIA EVENT (OUTPATIENT)
Dept: OPERATING ROOM | Facility: HOSPITAL | Age: 74
End: 2024-10-22
Payer: MEDICARE

## 2024-10-22 ENCOUNTER — ANESTHESIA (OUTPATIENT)
Dept: OPERATING ROOM | Facility: HOSPITAL | Age: 74
End: 2024-10-22
Payer: MEDICARE

## 2024-10-22 VITALS
SYSTOLIC BLOOD PRESSURE: 117 MMHG | HEIGHT: 60 IN | OXYGEN SATURATION: 98 % | BODY MASS INDEX: 20.04 KG/M2 | DIASTOLIC BLOOD PRESSURE: 59 MMHG | RESPIRATION RATE: 12 BRPM | HEART RATE: 76 BPM | WEIGHT: 102.07 LBS | TEMPERATURE: 97 F

## 2024-10-22 DIAGNOSIS — B97.7 HIGH RISK HPV INFECTION: ICD-10-CM

## 2024-10-22 DIAGNOSIS — N87.1 MODERATE DYSPLASIA OF CERVIX: Primary | ICD-10-CM

## 2024-10-22 PROCEDURE — 7100000002 HC RECOVERY ROOM TIME - EACH INCREMENTAL 1 MINUTE: Performed by: OBSTETRICS & GYNECOLOGY

## 2024-10-22 PROCEDURE — 3700000002 HC GENERAL ANESTHESIA TIME - EACH INCREMENTAL 1 MINUTE: Performed by: OBSTETRICS & GYNECOLOGY

## 2024-10-22 PROCEDURE — A57522 PR CONIZATION CERVIX,LOOP ELECTRD: Performed by: STUDENT IN AN ORGANIZED HEALTH CARE EDUCATION/TRAINING PROGRAM

## 2024-10-22 PROCEDURE — 3700000001 HC GENERAL ANESTHESIA TIME - INITIAL BASE CHARGE: Performed by: OBSTETRICS & GYNECOLOGY

## 2024-10-22 PROCEDURE — 88307 TISSUE EXAM BY PATHOLOGIST: CPT | Mod: TC,AHULAB | Performed by: OBSTETRICS & GYNECOLOGY

## 2024-10-22 PROCEDURE — 3600000008 HC OR TIME - EACH INCREMENTAL 1 MINUTE - PROCEDURE LEVEL THREE: Performed by: OBSTETRICS & GYNECOLOGY

## 2024-10-22 PROCEDURE — 2500000004 HC RX 250 GENERAL PHARMACY W/ HCPCS (ALT 636 FOR OP/ED): Performed by: OBSTETRICS & GYNECOLOGY

## 2024-10-22 PROCEDURE — 99100 ANES PT EXTEME AGE<1 YR&>70: CPT | Performed by: STUDENT IN AN ORGANIZED HEALTH CARE EDUCATION/TRAINING PROGRAM

## 2024-10-22 PROCEDURE — 3600000003 HC OR TIME - INITIAL BASE CHARGE - PROCEDURE LEVEL THREE: Performed by: OBSTETRICS & GYNECOLOGY

## 2024-10-22 PROCEDURE — 7100000009 HC PHASE TWO TIME - INITIAL BASE CHARGE: Performed by: OBSTETRICS & GYNECOLOGY

## 2024-10-22 PROCEDURE — 2500000001 HC RX 250 WO HCPCS SELF ADMINISTERED DRUGS (ALT 637 FOR MEDICARE OP): Performed by: OBSTETRICS & GYNECOLOGY

## 2024-10-22 PROCEDURE — 2500000004 HC RX 250 GENERAL PHARMACY W/ HCPCS (ALT 636 FOR OP/ED): Performed by: ANESTHESIOLOGIST ASSISTANT

## 2024-10-22 PROCEDURE — 7100000010 HC PHASE TWO TIME - EACH INCREMENTAL 1 MINUTE: Performed by: OBSTETRICS & GYNECOLOGY

## 2024-10-22 PROCEDURE — 7100000001 HC RECOVERY ROOM TIME - INITIAL BASE CHARGE: Performed by: OBSTETRICS & GYNECOLOGY

## 2024-10-22 PROCEDURE — A57522 PR CONIZATION CERVIX,LOOP ELECTRD: Performed by: ANESTHESIOLOGIST ASSISTANT

## 2024-10-22 PROCEDURE — 57522 CONIZATION OF CERVIX: CPT | Performed by: OBSTETRICS & GYNECOLOGY

## 2024-10-22 RX ORDER — FENTANYL CITRATE 50 UG/ML
INJECTION, SOLUTION INTRAMUSCULAR; INTRAVENOUS AS NEEDED
Status: DISCONTINUED | OUTPATIENT
Start: 2024-10-22 | End: 2024-10-22

## 2024-10-22 RX ORDER — LABETALOL HYDROCHLORIDE 5 MG/ML
5 INJECTION, SOLUTION INTRAVENOUS ONCE AS NEEDED
Status: DISCONTINUED | OUTPATIENT
Start: 2024-10-22 | End: 2024-10-22 | Stop reason: HOSPADM

## 2024-10-22 RX ORDER — LIDOCAINE HYDROCHLORIDE AND EPINEPHRINE 10; 10 MG/ML; UG/ML
INJECTION, SOLUTION INFILTRATION; PERINEURAL AS NEEDED
Status: DISCONTINUED | OUTPATIENT
Start: 2024-10-22 | End: 2024-10-22 | Stop reason: HOSPADM

## 2024-10-22 RX ORDER — SODIUM CHLORIDE, SODIUM LACTATE, POTASSIUM CHLORIDE, CALCIUM CHLORIDE 600; 310; 30; 20 MG/100ML; MG/100ML; MG/100ML; MG/100ML
20 INJECTION, SOLUTION INTRAVENOUS CONTINUOUS
Status: DISCONTINUED | OUTPATIENT
Start: 2024-10-22 | End: 2024-10-22 | Stop reason: HOSPADM

## 2024-10-22 RX ORDER — SODIUM CHLORIDE, SODIUM LACTATE, POTASSIUM CHLORIDE, CALCIUM CHLORIDE 600; 310; 30; 20 MG/100ML; MG/100ML; MG/100ML; MG/100ML
INJECTION, SOLUTION INTRAVENOUS CONTINUOUS PRN
Status: DISCONTINUED | OUTPATIENT
Start: 2024-10-22 | End: 2024-10-22

## 2024-10-22 RX ORDER — LIDOCAINE HYDROCHLORIDE 10 MG/ML
0.1 INJECTION, SOLUTION EPIDURAL; INFILTRATION; INTRACAUDAL; PERINEURAL ONCE
Status: DISCONTINUED | OUTPATIENT
Start: 2024-10-22 | End: 2024-10-22 | Stop reason: HOSPADM

## 2024-10-22 RX ORDER — IBUPROFEN 600 MG/1
600 TABLET ORAL EVERY 6 HOURS PRN
Qty: 15 TABLET | Refills: 0 | Status: SHIPPED | OUTPATIENT
Start: 2024-10-22

## 2024-10-22 RX ORDER — MIDAZOLAM HYDROCHLORIDE 1 MG/ML
INJECTION INTRAMUSCULAR; INTRAVENOUS AS NEEDED
Status: DISCONTINUED | OUTPATIENT
Start: 2024-10-22 | End: 2024-10-22

## 2024-10-22 RX ORDER — DIPHENHYDRAMINE HYDROCHLORIDE 50 MG/ML
12.5 INJECTION INTRAMUSCULAR; INTRAVENOUS ONCE AS NEEDED
Status: DISCONTINUED | OUTPATIENT
Start: 2024-10-22 | End: 2024-10-22 | Stop reason: HOSPADM

## 2024-10-22 RX ORDER — PROPOFOL 10 MG/ML
INJECTION, EMULSION INTRAVENOUS CONTINUOUS PRN
Status: DISCONTINUED | OUTPATIENT
Start: 2024-10-22 | End: 2024-10-22

## 2024-10-22 RX ORDER — OXYCODONE HYDROCHLORIDE 5 MG/1
5 TABLET ORAL EVERY 4 HOURS PRN
Status: DISCONTINUED | OUTPATIENT
Start: 2024-10-22 | End: 2024-10-22 | Stop reason: HOSPADM

## 2024-10-22 RX ORDER — ONDANSETRON HYDROCHLORIDE 2 MG/ML
4 INJECTION, SOLUTION INTRAVENOUS ONCE AS NEEDED
Status: DISCONTINUED | OUTPATIENT
Start: 2024-10-22 | End: 2024-10-22 | Stop reason: HOSPADM

## 2024-10-22 ASSESSMENT — PAIN - FUNCTIONAL ASSESSMENT
PAIN_FUNCTIONAL_ASSESSMENT: 0-10

## 2024-10-22 ASSESSMENT — PAIN SCALES - GENERAL
PAINLEVEL_OUTOF10: 0 - NO PAIN

## 2024-10-22 ASSESSMENT — COLUMBIA-SUICIDE SEVERITY RATING SCALE - C-SSRS
1. IN THE PAST MONTH, HAVE YOU WISHED YOU WERE DEAD OR WISHED YOU COULD GO TO SLEEP AND NOT WAKE UP?: NO
6. HAVE YOU EVER DONE ANYTHING, STARTED TO DO ANYTHING, OR PREPARED TO DO ANYTHING TO END YOUR LIFE?: NO
2. HAVE YOU ACTUALLY HAD ANY THOUGHTS OF KILLING YOURSELF?: NO

## 2024-10-22 NOTE — OP NOTE
Cervix Lesion Excision Operative Note     Date: 10/22/2024  OR Location: [facility] OR    Name: Suha Arellano, : 1950, Age: 74 y.o., MRN: [id number], Sex: female    Diagnosis  Pre-op Diagnosis      * Moderate dysplasia of cervix [N87.1] Post-op Diagnosis     * Moderate dysplasia of cervix [N87.1]     Procedures  Cervix Lesion Excision  70122 - OR CONIZATION CERVIX W/WO D&C RPR ELTRD EXC      Surgeons      * Mady Lopez V - Primary    Resident/Fellow/Other Assistant:  Surgeons and Role:  * No surgeons found with a matching role *    Procedure Summary  Anesthesia: Anesthesia type not filed in the log.  ASA: II  Anesthesia Staff: Anesthesiologist: Deepak Arora MD  C-AA: ALIREZA Keane  Estimated Blood Loss: 5mL  Intra-op Medications:   Administrations occurring from 0930 to 1030 on 10/22/24:   Medication Name Total Dose   lidocaine-epinephrine (Xylocaine W/EPI) 1 %-1:100,000 injection 10 mL   ferric subsulfate (Astringyn) solution 1 Application   fentaNYL (Sublimaze) injection 50 mcg/mL 100 mcg   midazolam PF (Versed) injection 1 mg/mL 2 mg   propofol (Diprivan) injection 10 mg/mL 111.12 mg              Anesthesia Record               Intraprocedure I/O Totals       None           Specimen:   ID Type Source Tests Collected by Time   1 : LEEP Tissue CERVIX LEEP SURGICAL PATHOLOGY EXAM Cortney Wood RN 10/22/2024 1001   2 : ECC Tissue ENDOCERVIX CURETTINGS SURGICAL PATHOLOGY EXAM Cortney Wood RN 10/22/2024 1010        Staff:   Circulator: Cortney  Scrub Person: Rosemarie         Drains and/or Catheters: * None in log *    Tourniquet Times:         Implants:     Findings: normal appearing postmenopausal uterus    Indications: Suha Arellano is an 74 y.o. female who is having surgery for Moderate dysplasia of cervix [N87.1].     The patient was seen in the preoperative area. The risks, benefits, complications, treatment options, non-operative alternatives, expected recovery and outcomes were discussed with the  patient. The possibilities of reaction to medication, pulmonary aspiration, injury to surrounding structures, bleeding, recurrent infection, the need for additional procedures, failure to diagnose a condition, and creating a complication requiring transfusion or operation were discussed with the patient. The patient concurred with the proposed plan, giving informed consent.  The site of surgery was properly noted/marked if necessary per policy. The patient has been actively warmed in preoperative area. Preoperative antibiotics are not indicated. Venous thrombosis prophylaxis have been ordered including bilateral sequential compression devices    Procedure Details: Pt was taken to the OR where anesthesia was administered. She was then placed in the dorsal lithotomy position.  She was then prepped and draped in the usual sterile fashion.  The bladder was emptied using a catheter.  A coated speculum was placed into the vagina.   No gross cervical abnormalities were appreciated.  A coated tenaculum was used to grasp the anterior lip of the cervix.  A paracervical block was performed with 10 mL of lidocaine with 1% epinephrine.  LEEP was performed with the 10 mm size loop.  An EEC was collected.  The LEEP bed was made hemostatic with the roller ball.  Monsel's solution was applied.  Good hemostasis was noted. The speculum was removed. The specimen was sent to pathology.  The patient tolerated the procedure well.     Complications:  None; patient tolerated the procedure well.    Disposition: PACU - hemodynamically stable.  Condition: stable         Additional Details: none    Attending Attestation: I was present and scrubbed for the entire procedure.    Mady Lopez V  Phone Number: [phone]

## 2024-10-22 NOTE — ANESTHESIA POSTPROCEDURE EVALUATION
Patient: Suha Arellano    Procedure Summary       Date: 10/22/24 Room / Location: [facility] OR 18 / Virtual [facility] OR    Anesthesia Start: 0939 Anesthesia Stop: 1016    Procedure: Cervix Lesion Excision Diagnosis:       Moderate dysplasia of cervix      (Moderate dysplasia of cervix [N87.1])    Surgeons: Mady STONE MD Responsible Provider: Deepak Arora MD    Anesthesia Type: MAC ASA Status: 2            Anesthesia Type: MAC    Vitals Value Taken Time   /64 10/22/24 1046   Temp 36.3 °C (97.3 °F) 10/22/24 1013   Pulse 74 10/22/24 1058   Resp 12 10/22/24 1058   SpO2 98 % 10/22/24 1058   Vitals shown include unfiled device data.    Anesthesia Post Evaluation    Patient location during evaluation: bedside  Patient participation: complete - patient participated  Level of consciousness: awake  Pain management: adequate  Multimodal analgesia pain management approach  Airway patency: patent  Cardiovascular status: stable  Respiratory status: spontaneous ventilation and unassisted  Hydration status: acceptable  Postoperative Nausea and Vomiting: none  Comments: No significant PONV.        No notable events documented.

## 2024-10-22 NOTE — ANESTHESIA PREPROCEDURE EVALUATION
Patient: Suha Arellano    Procedure Information       Date/Time: 10/22/24 0930    Procedure: Cervix Lesion Excision    Location: [facility] OR 18 / [facility] OR    Surgeons: Mady STONE MD            Relevant Problems   ID   (+) High risk HPV infection       Clinical information reviewed:   Tobacco  Allergies  Meds   Med Hx  Surg Hx  OB Status  Fam Hx  Soc   Hx        NPO Detail:  NPO/Void Status  Carbohydrate Drink Given Prior to Surgery? : N  Date of Last Liquid: 10/22/24  Time of Last Liquid: 0300  Date of Last Solid: 10/21/24  Time of Last Solid: 2000  Last Intake Type: Clear fluids (water)  Time of Last Void: 0800         Physical Exam    Airway  Mallampati: II  TM distance: >3 FB  Neck ROM: full     Cardiovascular   Rhythm: regular  Rate: normal     Dental    Pulmonary   Breath sounds clear to auscultation     Abdominal          Anesthesia Plan    History of general anesthesia?: yes  History of complications of general anesthesia?: no    ASA 2     MAC     intravenous induction   Anesthetic plan and risks discussed with patient.    Plan discussed with CRNA.

## 2024-10-22 NOTE — INTERVAL H&P NOTE
Pt is here for LEEP d/t possible intraepithelial lesion on colposcopy.  H&P from 10/10/24 reviewed and no significant changes noted.

## 2024-10-28 LAB
LABORATORY COMMENT REPORT: NORMAL
PATH REPORT.FINAL DX SPEC: NORMAL
PATH REPORT.GROSS SPEC: NORMAL
PATH REPORT.RELEVANT HX SPEC: NORMAL
PATH REPORT.TOTAL CANCER: NORMAL

## 2024-11-18 ENCOUNTER — APPOINTMENT (OUTPATIENT)
Dept: OBSTETRICS AND GYNECOLOGY | Facility: CLINIC | Age: 74
End: 2024-11-18
Payer: MEDICARE

## 2024-11-18 VITALS
HEIGHT: 60 IN | BODY MASS INDEX: 20.03 KG/M2 | SYSTOLIC BLOOD PRESSURE: 124 MMHG | WEIGHT: 102 LBS | DIASTOLIC BLOOD PRESSURE: 78 MMHG

## 2024-11-18 DIAGNOSIS — N89.8 VAGINAL DISCHARGE: Primary | ICD-10-CM

## 2024-11-18 DIAGNOSIS — Z12.31 BREAST CANCER SCREENING BY MAMMOGRAM: ICD-10-CM

## 2024-11-18 PROCEDURE — 1159F MED LIST DOCD IN RCRD: CPT | Performed by: OBSTETRICS & GYNECOLOGY

## 2024-11-18 PROCEDURE — 1036F TOBACCO NON-USER: CPT | Performed by: OBSTETRICS & GYNECOLOGY

## 2024-11-18 PROCEDURE — 99213 OFFICE O/P EST LOW 20 MIN: CPT | Performed by: OBSTETRICS & GYNECOLOGY

## 2024-11-18 RX ORDER — METRONIDAZOLE 500 MG/1
500 TABLET ORAL 2 TIMES DAILY
Qty: 14 TABLET | Refills: 0 | Status: SHIPPED | OUTPATIENT
Start: 2024-11-18 | End: 2024-11-25

## 2024-11-18 ASSESSMENT — ENCOUNTER SYMPTOMS
COLOR CHANGE: 0
DIARRHEA: 0
CHILLS: 0
FREQUENCY: 0
ABDOMINAL PAIN: 0
FATIGUE: 0
FLANK PAIN: 0
HEMATURIA: 0
UNEXPECTED WEIGHT CHANGE: 0
BACK PAIN: 0
ABDOMINAL DISTENTION: 0
APPETITE CHANGE: 0
SHORTNESS OF BREATH: 0
CONSTIPATION: 0
NAUSEA: 0
FEVER: 0
BLOOD IN STOOL: 0
DYSURIA: 0
VOMITING: 0
SLEEP DISTURBANCE: 0

## 2024-11-18 NOTE — PROGRESS NOTES
Suha Arellano is a 74 y.o.  here for post op visit   S/p LEEP 10/22  Pap NILM, HPV 16+ ; ECC during colposcopy showed possible dysplasia     Pt had some minor bleeding following surgery.  She continues to have a vaginal odor which she finds concerning.   Denies pain or burning.      Past medical and surgical history reviewed.     Obstetric History  OB History    Para Term  AB Living   3 3 3     3   SAB IAB Ectopic Multiple Live Births                  # Outcome Date GA Lbr Alan/2nd Weight Sex Type Anes PTL Lv   3 Term            2 Term            1 Term                 Past Medical History  She has a past medical history of Abnormal Pap smear of cervix, Cervical dysplasia, Osteopenia, Osteoporosis, and PONV (postoperative nausea and vomiting).    She has no past medical history of ADD (attention deficit disorder), ADHD (attention deficit hyperactivity disorder), FABIANA (acute kidney injury) (CMS-HCC), Anxiety, Arthritis, Autism ([facility]), Autoimmune disorder (Multi), Biliary disease, Bipolar disorder, Bladder cancer (Multi), BPH (benign prostatic hyperplasia), Cancer of neurologic system ([facility]), Celiac disease ([facility]), Cerebral aneurysm ([facility]), Cerebral palsy, Cerebral vascular accident (Multi), Cervical disc disease, Cholelithiasis, Chronic headaches, Chronic kidney disease, Chronic pain disorder, Cirrhosis (Multi), CKD (chronic kidney disease), Cognitive decline, Cognitive disorder, Colon polyp, Colorectal cancer (Multi), Crohn's disease (Multi), Dementia, Depression, Diverticulosis, Dizziness, Dysfunctional uterine bleeding, Dysphagia, Endometrial cancer (Multi), Esophageal cancer (Multi), Esophageal disease, ESRD (end stage renal disease) (Multi), Fibroid, Fibromyalgia, primary, Fractures, Gastric cancer (Multi), Gender dysphoria, GERD (gastroesophageal reflux disease), Gestational diabetes, Gestational hypertension ([facility]), GI (gastrointestinal bleed), Hemodialysis status (CMS-HCC),  Hiatal hernia, History of peritoneal dialysis, HIV disease (Multi), Hydrocephalus, Immunocompromised, Intellectual disability, Irritable bowel syndrome, Joint pain, Liver disease, Lumbar disc disease, Lupus, Mastocytosis, MS (multiple sclerosis) (Multi), Muscular dystrophy (Multi), Myasthenia gravis, Myoneural disorder (Multi), Myotonia, POTTER (nonalcoholic steatohepatitis), Nephrolithiasis, Neuromuscular disorder (Multi), Ovarian cancer (Multi), Pancreatic cancer (Multi), Pancreatitis ([facility]), Pelvic mass, Peptic ulcer disease, Peripheral neuropathy, Personal history of other mental and behavioral disorders, Polycystic ovarian disease, Pregnant ([facility]), Prematurity ([facility]), Prostate cancer (Multi), PTSD (post-traumatic stress disorder), Pyelonephritis, Renal cancer (Multi), Renal disease due to hypertension, Schizophrenia, Scoliosis, Seizure disorder (Multi), Spinal stenosis, Substance addiction (Multi), Thoracic spinal cord injury (Multi), TIA (transient ischemic attack), Ulcerative colitis, Urinary tract infection, Uterine cancer (Multi), or Vertigo.    Surgical History  She has a past surgical history that includes Breast biopsy; Tonsillectomy (08/27/2014); Adenoidectomy; Colonoscopy; Colposcopy; Cataract extraction; and Foot fracture surgery.     Social History  She reports that she has never smoked. She has been exposed to tobacco smoke. She has never used smokeless tobacco. She reports that she does not drink alcohol and does not use drugs.    Family History  Family History   Problem Relation Name Age of Onset    Cancer Sister Peggy Person          /78 (BP Location: Left arm, Patient Position: Sitting, BP Cuff Size: Small adult)   Ht 1.524 m (5')   Wt 46.3 kg (102 lb)   LMP  (LMP Unknown)   BMI 19.92 kg/m²   No LMP recorded (lmp unknown). Patient is postmenopausal.    Review of Systems   Constitutional:  Negative for appetite change, chills, fatigue, fever and unexpected weight change.    Respiratory:  Negative for shortness of breath.    Cardiovascular:  Negative for chest pain.   Gastrointestinal:  Negative for abdominal distention, abdominal pain, blood in stool, constipation, diarrhea, nausea and vomiting.   Endocrine: Negative for cold intolerance and heat intolerance.   Genitourinary:  Negative for dyspareunia, dysuria, flank pain, frequency, genital sores, hematuria, menstrual problem, pelvic pain, urgency, vaginal bleeding, vaginal discharge and vaginal pain.   Musculoskeletal:  Negative for back pain.   Skin:  Negative for color change.   Psychiatric/Behavioral:  Negative for sleep disturbance.        Physical Exam  Constitutional:       Appearance: Normal appearance.   Genitourinary:     General: Normal vulva.      Vagina: Vaginal discharge and erythema present.      Cervix: Erythema present.      Comments: Cervix erythematous with yellow discharge at margins of LEEP  Neurological:      Mental Status: She is alert.   Psychiatric:         Mood and Affect: Mood normal.         Behavior: Behavior normal.           Assessment and Plan:    Postop visit for LEEP  Reviewed pathology which was benign   Suspect mild infection at LEEP site - rx for metronidazole BID x 7 days  Return 2-3 weeks or sooner if any concerning symptoms    Pt requests order for mammogram

## 2024-12-18 ENCOUNTER — APPOINTMENT (OUTPATIENT)
Dept: OBSTETRICS AND GYNECOLOGY | Facility: CLINIC | Age: 74
End: 2024-12-18
Payer: MEDICARE

## 2024-12-18 VITALS — HEIGHT: 60 IN | BODY MASS INDEX: 19.92 KG/M2 | DIASTOLIC BLOOD PRESSURE: 60 MMHG | SYSTOLIC BLOOD PRESSURE: 110 MMHG

## 2024-12-18 DIAGNOSIS — Z09 POSTOP CHECK: ICD-10-CM

## 2024-12-18 DIAGNOSIS — N89.8 VAGINAL DISCHARGE: Primary | ICD-10-CM

## 2024-12-18 PROCEDURE — 99213 OFFICE O/P EST LOW 20 MIN: CPT | Performed by: OBSTETRICS & GYNECOLOGY

## 2024-12-18 PROCEDURE — 1126F AMNT PAIN NOTED NONE PRSNT: CPT | Performed by: OBSTETRICS & GYNECOLOGY

## 2024-12-18 PROCEDURE — 1159F MED LIST DOCD IN RCRD: CPT | Performed by: OBSTETRICS & GYNECOLOGY

## 2024-12-18 RX ORDER — CLINDAMYCIN HYDROCHLORIDE 300 MG/1
300 CAPSULE ORAL 3 TIMES DAILY
Qty: 21 CAPSULE | Refills: 0 | Status: SHIPPED | OUTPATIENT
Start: 2024-12-18 | End: 2024-12-25

## 2024-12-18 ASSESSMENT — ENCOUNTER SYMPTOMS
DYSURIA: 0
NAUSEA: 0
BLOOD IN STOOL: 0
ABDOMINAL DISTENTION: 0
FEVER: 0
FREQUENCY: 0
BACK PAIN: 0
SHORTNESS OF BREATH: 0
CHILLS: 0
COLOR CHANGE: 0
HEMATURIA: 0
VOMITING: 0
UNEXPECTED WEIGHT CHANGE: 0
SLEEP DISTURBANCE: 0
APPETITE CHANGE: 0
FLANK PAIN: 0
DIARRHEA: 0
CONSTIPATION: 0
ABDOMINAL PAIN: 0
FATIGUE: 0

## 2024-12-18 ASSESSMENT — PAIN SCALES - GENERAL: PAINLEVEL_OUTOF10: 0-NO PAIN

## 2024-12-18 NOTE — PROGRESS NOTES
Suha Arellano is a 74 y.o.  here for follow up post LEEP    S/p LEEP 10/22/24. Pt seen 24 for initial post op visit and found to have poorly healed LEEP bed with evidence of infection. Pt is s/p course of oral metronidazole.  Pt reports the odor (which was her only complaint at the time) resolved but now seems to have returned.   No other complaint. No pain or bleeding or discharge .     Past medical and surgical history reviewed.     Obstetric History  OB History    Para Term  AB Living   3 3 3     3   SAB IAB Ectopic Multiple Live Births                  # Outcome Date GA Lbr Alan/2nd Weight Sex Type Anes PTL Lv   3 Term            2 Term            1 Term                 Past Medical History  She has a past medical history of Abnormal Pap smear of cervix, Cervical dysplasia, Osteopenia, Osteoporosis, and PONV (postoperative nausea and vomiting).    She has no past medical history of ADD (attention deficit disorder), ADHD (attention deficit hyperactivity disorder), FABIANA (acute kidney injury) (CMS-HCC), Anxiety, Arthritis, Autism ([facility]), Autoimmune disorder (Multi), Biliary disease, Bipolar disorder, Bladder cancer (Multi), BPH (benign prostatic hyperplasia), Cancer of neurologic system ([facility]), Celiac disease ([facility]), Cerebral aneurysm ([facility]), Cerebral palsy, Cerebral vascular accident (Multi), Cervical disc disease, Cholelithiasis, Chronic headaches, Chronic kidney disease, Chronic pain disorder, Cirrhosis (Multi), CKD (chronic kidney disease), Cognitive decline, Cognitive disorder, Colon polyp, Colorectal cancer (Multi), Crohn's disease (Multi), Dementia, Depression, Diverticulosis, Dizziness, Dysfunctional uterine bleeding, Dysphagia, Endometrial cancer (Multi), Esophageal cancer (Multi), Esophageal disease, ESRD (end stage renal disease) (Multi), Fibroid, Fibromyalgia, primary, Fractures, Gastric cancer (Multi), Gender dysphoria, GERD (gastroesophageal reflux disease),  Gestational diabetes, Gestational hypertension ([facility]), GI (gastrointestinal bleed), Hemodialysis status ([facility]), Hiatal hernia, History of peritoneal dialysis, HIV disease (Multi), Hydrocephalus, Immunocompromised, Intellectual disability, Irritable bowel syndrome, Joint pain, Liver disease, Lumbar disc disease, Lupus, Mastocytosis, MS (multiple sclerosis) (Multi), Muscular dystrophy (Multi), Myasthenia gravis, Myoneural disorder (Multi), Myotonia, POTTER (nonalcoholic steatohepatitis), Nephrolithiasis, Neuromuscular disorder (Multi), Ovarian cancer (Multi), Pancreatic cancer (Multi), Pancreatitis ([facility]), Pelvic mass, Peptic ulcer disease, Peripheral neuropathy, Personal history of other mental and behavioral disorders, Polycystic ovarian disease, Pregnant ([facility]), Prematurity ([facility]), Prostate cancer (Multi), PTSD (post-traumatic stress disorder), Pyelonephritis, Renal cancer (Multi), Renal disease due to hypertension, Schizophrenia, Scoliosis, Seizure disorder (Multi), Spinal stenosis, Substance addiction (Multi), Thoracic spinal cord injury (Multi), TIA (transient ischemic attack), Ulcerative colitis, Urinary tract infection, Uterine cancer (Multi), or Vertigo.    Surgical History  She has a past surgical history that includes Breast biopsy; Tonsillectomy (08/27/2014); Adenoidectomy; Colonoscopy; Colposcopy; Cataract extraction; and Foot fracture surgery.     Social History  She reports that she has never smoked. She has been exposed to tobacco smoke. She has never used smokeless tobacco. She reports that she does not drink alcohol and does not use drugs.    Family History  Family History   Problem Relation Name Age of Onset    Cancer Sister Peggy Person          /60 (BP Location: Right arm, Patient Position: Sitting)   Ht 1.524 m (5')   LMP  (LMP Unknown)   BMI 19.92 kg/m²   No LMP recorded (lmp unknown). Patient is postmenopausal.    Review of Systems   Constitutional:  Negative for appetite  change, chills, fatigue, fever and unexpected weight change.   Respiratory:  Negative for shortness of breath.    Cardiovascular:  Negative for chest pain.   Gastrointestinal:  Negative for abdominal distention, abdominal pain, blood in stool, constipation, diarrhea, nausea and vomiting.   Endocrine: Negative for cold intolerance and heat intolerance.   Genitourinary:  Positive for vaginal discharge (vaginal odor). Negative for dyspareunia, dysuria, flank pain, frequency, genital sores, hematuria, menstrual problem, pelvic pain, urgency, vaginal bleeding and vaginal pain.   Musculoskeletal:  Negative for back pain.   Skin:  Negative for color change.   Psychiatric/Behavioral:  Negative for sleep disturbance.        Physical Exam  Constitutional:       Appearance: Normal appearance.   Genitourinary:     General: Normal vulva.      Vagina: Normal.      Cervix: Erythema present.      Comments: Vaginal atrophy present  LEEP bed with erythema and purulent discharge at edges , improved from last visit   Skin:     General: Skin is warm and dry.   Neurological:      General: No focal deficit present.      Mental Status: She is alert.   Psychiatric:         Mood and Affect: Mood normal.         Behavior: Behavior normal.           Assessment and Plan:    Infection of LEEP bed  Recommend second course of abx - clindamycin 300 TID x 1 week  Pt will return early January and will consider short course of vaginal estradiol to aid in healing  Of note, pt leaving for [place] 1/9/25 for three months

## 2024-12-30 DIAGNOSIS — Z00.00 HEALTHCARE MAINTENANCE: ICD-10-CM

## 2024-12-31 ENCOUNTER — LAB (OUTPATIENT)
Dept: LAB | Facility: LAB | Age: 74
End: 2024-12-31
Payer: MEDICARE

## 2024-12-31 DIAGNOSIS — Z00.00 HEALTHCARE MAINTENANCE: ICD-10-CM

## 2024-12-31 LAB
25(OH)D3 SERPL-MCNC: 39 NG/ML (ref 30–100)
ALBUMIN SERPL BCP-MCNC: 4.3 G/DL (ref 3.4–5)
ALP SERPL-CCNC: 48 U/L (ref 33–136)
ALT SERPL W P-5'-P-CCNC: 12 U/L (ref 7–45)
ANION GAP SERPL CALC-SCNC: 13 MMOL/L (ref 10–20)
AST SERPL W P-5'-P-CCNC: 20 U/L (ref 9–39)
BASOPHILS # BLD AUTO: 0.06 X10*3/UL (ref 0–0.1)
BASOPHILS NFR BLD AUTO: 0.9 %
BILIRUB SERPL-MCNC: 0.7 MG/DL (ref 0–1.2)
BUN SERPL-MCNC: 14 MG/DL (ref 6–23)
CALCIUM SERPL-MCNC: 9.7 MG/DL (ref 8.6–10.6)
CHLORIDE SERPL-SCNC: 104 MMOL/L (ref 98–107)
CHOLEST SERPL-MCNC: 263 MG/DL (ref 0–199)
CHOLESTEROL/HDL RATIO: 2.6
CO2 SERPL-SCNC: 31 MMOL/L (ref 21–32)
CREAT SERPL-MCNC: 0.72 MG/DL (ref 0.5–1.05)
CRP SERPL HS-MCNC: 9.9 MG/L
EGFRCR SERPLBLD CKD-EPI 2021: 87 ML/MIN/1.73M*2
EOSINOPHIL # BLD AUTO: 0.18 X10*3/UL (ref 0–0.4)
EOSINOPHIL NFR BLD AUTO: 2.7 %
ERYTHROCYTE [DISTWIDTH] IN BLOOD BY AUTOMATED COUNT: 13 % (ref 11.5–14.5)
EST. AVERAGE GLUCOSE BLD GHB EST-MCNC: 103 MG/DL
GLUCOSE SERPL-MCNC: 93 MG/DL (ref 74–99)
HBA1C MFR BLD: 5.2 %
HCT VFR BLD AUTO: 47.9 % (ref 36–46)
HDLC SERPL-MCNC: 101.6 MG/DL
HGB BLD-MCNC: 15.5 G/DL (ref 12–16)
IMM GRANULOCYTES # BLD AUTO: 0.01 X10*3/UL (ref 0–0.5)
IMM GRANULOCYTES NFR BLD AUTO: 0.2 % (ref 0–0.9)
LDLC SERPL CALC-MCNC: 143 MG/DL
LYMPHOCYTES # BLD AUTO: 2.05 X10*3/UL (ref 0.8–3)
LYMPHOCYTES NFR BLD AUTO: 31.1 %
MCH RBC QN AUTO: 30.8 PG (ref 26–34)
MCHC RBC AUTO-ENTMCNC: 32.4 G/DL (ref 32–36)
MCV RBC AUTO: 95 FL (ref 80–100)
MONOCYTES # BLD AUTO: 0.51 X10*3/UL (ref 0.05–0.8)
MONOCYTES NFR BLD AUTO: 7.7 %
NEUTROPHILS # BLD AUTO: 3.79 X10*3/UL (ref 1.6–5.5)
NEUTROPHILS NFR BLD AUTO: 57.4 %
NON HDL CHOLESTEROL: 161 MG/DL (ref 0–149)
NRBC BLD-RTO: 0 /100 WBCS (ref 0–0)
PLATELET # BLD AUTO: 260 X10*3/UL (ref 150–450)
POTASSIUM SERPL-SCNC: 4.7 MMOL/L (ref 3.5–5.3)
PROT SERPL-MCNC: 6.5 G/DL (ref 6.4–8.2)
RBC # BLD AUTO: 5.04 X10*6/UL (ref 4–5.2)
SODIUM SERPL-SCNC: 143 MMOL/L (ref 136–145)
TRIGL SERPL-MCNC: 94 MG/DL (ref 0–149)
TSH SERPL-ACNC: 1.32 MIU/L (ref 0.44–3.98)
VLDL: 19 MG/DL (ref 0–40)
WBC # BLD AUTO: 6.6 X10*3/UL (ref 4.4–11.3)

## 2024-12-31 PROCEDURE — 83036 HEMOGLOBIN GLYCOSYLATED A1C: CPT

## 2024-12-31 PROCEDURE — 80061 LIPID PANEL: CPT

## 2024-12-31 PROCEDURE — 82306 VITAMIN D 25 HYDROXY: CPT

## 2024-12-31 PROCEDURE — 80053 COMPREHEN METABOLIC PANEL: CPT

## 2024-12-31 PROCEDURE — 84443 ASSAY THYROID STIM HORMONE: CPT

## 2024-12-31 PROCEDURE — 85025 COMPLETE CBC W/AUTO DIFF WBC: CPT

## 2024-12-31 PROCEDURE — 86141 C-REACTIVE PROTEIN HS: CPT

## 2024-12-31 ASSESSMENT — PROMIS GLOBAL HEALTH SCALE
RATE_GENERAL_HEALTH: VERY GOOD
RATE_PHYSICAL_HEALTH: VERY GOOD
RATE_QUALITY_OF_LIFE: VERY GOOD
CARRYOUT_PHYSICAL_ACTIVITIES: COMPLETELY
RATE_MENTAL_HEALTH: VERY GOOD
CARRYOUT_SOCIAL_ACTIVITIES: VERY GOOD
RATE_AVERAGE_FATIGUE: MILD
EMOTIONAL_PROBLEMS: RARELY
RATE_SOCIAL_SATISFACTION: VERY GOOD
RATE_AVERAGE_PAIN: 0

## 2025-01-03 ENCOUNTER — LAB (OUTPATIENT)
Dept: LAB | Facility: LAB | Age: 75
End: 2025-01-03
Payer: MEDICARE

## 2025-01-03 DIAGNOSIS — Z00.00 HEALTHCARE MAINTENANCE: ICD-10-CM

## 2025-01-03 LAB
APPEARANCE UR: ABNORMAL
BILIRUB UR STRIP.AUTO-MCNC: NEGATIVE MG/DL
COLOR UR: ABNORMAL
GLUCOSE UR STRIP.AUTO-MCNC: NORMAL MG/DL
HOLD SPECIMEN: NORMAL
KETONES UR STRIP.AUTO-MCNC: NEGATIVE MG/DL
LEUKOCYTE ESTERASE UR QL STRIP.AUTO: NEGATIVE
MUCOUS THREADS #/AREA URNS AUTO: ABNORMAL /LPF
NITRITE UR QL STRIP.AUTO: NEGATIVE
PH UR STRIP.AUTO: 5 [PH]
PROT UR STRIP.AUTO-MCNC: NEGATIVE MG/DL
RBC # UR STRIP.AUTO: ABNORMAL /UL
RBC #/AREA URNS AUTO: ABNORMAL /HPF
SP GR UR STRIP.AUTO: 1.02
UROBILINOGEN UR STRIP.AUTO-MCNC: NORMAL MG/DL
WBC #/AREA URNS AUTO: ABNORMAL /HPF

## 2025-01-03 PROCEDURE — 81001 URINALYSIS AUTO W/SCOPE: CPT

## 2025-01-06 ENCOUNTER — OFFICE VISIT (OUTPATIENT)
Dept: PRIMARY CARE | Facility: CLINIC | Age: 75
End: 2025-01-06
Payer: MEDICARE

## 2025-01-06 ENCOUNTER — LAB (OUTPATIENT)
Dept: LAB | Facility: LAB | Age: 75
End: 2025-01-06
Payer: MEDICARE

## 2025-01-06 ENCOUNTER — APPOINTMENT (OUTPATIENT)
Dept: PRIMARY CARE | Facility: CLINIC | Age: 75
End: 2025-01-06
Payer: MEDICARE

## 2025-01-06 VITALS
TEMPERATURE: 98.2 F | HEIGHT: 59 IN | HEART RATE: 87 BPM | DIASTOLIC BLOOD PRESSURE: 76 MMHG | OXYGEN SATURATION: 99 % | WEIGHT: 96.8 LBS | SYSTOLIC BLOOD PRESSURE: 155 MMHG | BODY MASS INDEX: 19.52 KG/M2

## 2025-01-06 VITALS
HEART RATE: 87 BPM | SYSTOLIC BLOOD PRESSURE: 155 MMHG | DIASTOLIC BLOOD PRESSURE: 76 MMHG | HEIGHT: 59 IN | WEIGHT: 96.8 LBS | TEMPERATURE: 98.2 F | BODY MASS INDEX: 19.52 KG/M2 | OXYGEN SATURATION: 99 %

## 2025-01-06 DIAGNOSIS — R31.9 HEMATURIA, UNSPECIFIED TYPE: ICD-10-CM

## 2025-01-06 DIAGNOSIS — E78.5 DYSLIPIDEMIA: ICD-10-CM

## 2025-01-06 DIAGNOSIS — Z00.01 ENCOUNTER FOR GENERAL ADULT MEDICAL EXAMINATION WITH ABNORMAL FINDINGS: Primary | ICD-10-CM

## 2025-01-06 DIAGNOSIS — Z12.11 COLON CANCER SCREENING: ICD-10-CM

## 2025-01-06 DIAGNOSIS — Z00.00 MEDICARE ANNUAL WELLNESS VISIT, SUBSEQUENT: Primary | ICD-10-CM

## 2025-01-06 DIAGNOSIS — M85.89 OSTEOPENIA OF MULTIPLE SITES: ICD-10-CM

## 2025-01-06 DIAGNOSIS — R03.0 ELEVATED BLOOD PRESSURE READING: ICD-10-CM

## 2025-01-06 DIAGNOSIS — M85.80 OSTEOPENIA, UNSPECIFIED LOCATION: ICD-10-CM

## 2025-01-06 LAB
APPEARANCE UR: ABNORMAL
BILIRUB UR STRIP.AUTO-MCNC: NEGATIVE MG/DL
CAOX CRY #/AREA UR COMP ASSIST: ABNORMAL /HPF
COLOR UR: ABNORMAL
GLUCOSE UR STRIP.AUTO-MCNC: NORMAL MG/DL
HOLD SPECIMEN: NORMAL
KETONES UR STRIP.AUTO-MCNC: ABNORMAL MG/DL
LEUKOCYTE ESTERASE UR QL STRIP.AUTO: NEGATIVE
MUCOUS THREADS #/AREA URNS AUTO: ABNORMAL /LPF
NITRITE UR QL STRIP.AUTO: NEGATIVE
PH UR STRIP.AUTO: 5.5 [PH]
PROT UR STRIP.AUTO-MCNC: ABNORMAL MG/DL
RBC # UR STRIP.AUTO: NEGATIVE /UL
RBC #/AREA URNS AUTO: ABNORMAL /HPF
SP GR UR STRIP.AUTO: 1.03
UROBILINOGEN UR STRIP.AUTO-MCNC: NORMAL MG/DL
WBC #/AREA URNS AUTO: ABNORMAL /HPF

## 2025-01-06 PROCEDURE — 81001 URINALYSIS AUTO W/SCOPE: CPT

## 2025-01-06 ASSESSMENT — PATIENT HEALTH QUESTIONNAIRE - PHQ9
1. LITTLE INTEREST OR PLEASURE IN DOING THINGS: NOT AT ALL
SUM OF ALL RESPONSES TO PHQ9 QUESTIONS 1 & 2: 0
2. FEELING DOWN, DEPRESSED OR HOPELESS: NOT AT ALL

## 2025-01-06 ASSESSMENT — ENCOUNTER SYMPTOMS
LOSS OF SENSATION IN FEET: 0
DEPRESSION: 0
OCCASIONAL FEELINGS OF UNSTEADINESS: 0

## 2025-01-06 ASSESSMENT — PAIN SCALES - GENERAL
PAINLEVEL_OUTOF10: 0-NO PAIN
PAINLEVEL_OUTOF10: 0-NO PAIN

## 2025-01-06 ASSESSMENT — LIFESTYLE VARIABLES
HOW OFTEN DO YOU HAVE A DRINK CONTAINING ALCOHOL: NEVER
SKIP TO QUESTIONS 9-10: 1
HOW OFTEN DO YOU HAVE SIX OR MORE DRINKS ON ONE OCCASION: NEVER
HOW MANY STANDARD DRINKS CONTAINING ALCOHOL DO YOU HAVE ON A TYPICAL DAY: PATIENT DOES NOT DRINK
AUDIT-C TOTAL SCORE: 0

## 2025-01-06 NOTE — PROGRESS NOTES
"Subjective   Patient ID: Suha Arellano is a 74 y.o. female who presents for Annual Exam ([facility]).  HPI  1) microscopic hematuria on recent urinalysis - not aware of any prior history of this, no kidney stone history - she is not having UTI symptoms and no significant number of white blood cells nor bacteria noted on her urinalysis  No significant tobacco history, however she has been treated recently for some HPV gynecologic concerns and has follow-up with Dr. Lopez later this week     2) treatment for osteopenia -taking and tolerating the alendronate well -last DEXA scan showed some improvement relative to most previous testing -she will be due sometime later this year for repeat testing -her most recent vitamin D was in the 30s, she had been on an over-the-counter supplement but her level was creeping a bit too high and she stopped it     3) elevated LDL, w/ low TC:HDL, no sig Fhx early CAD noted and CT calcium scoring test less than 2 years ago revealed a score of \"0\" - she has not had any cardiac symptoms, but her blood pressure is elevated today (at least systolic-wise) -she does report feeling a bit stressed/anxious about her upcoming travel down to [place] she will be doing some highway driving with her daughter - denies chest pain, shortness of breath, dizziness, lightheadedness, hand or foot swelling that is new or different.   Doing well with physical activity.  In the late 90s she had a stress test done at the [facility], but no ECG copy available    4) Prevention:  Breast cancer screening: Plans to get on her return from [place] in spring  Bone density screening: Can assist with scheduling for this spring as well  Colon cancer screening: will order Cologuard for when patient returns  Skin cancer screening:; Does see Dr Hernandez     Review of Systems  Essentially noncontributory otherwise    Objective   /76 (BP Location: Left arm, Patient Position: Sitting, BP Cuff Size: Child)   Pulse " "87   Temp 36.8 °C (98.2 °F) (Temporal)   Ht 1.507 m (4' 11.33\")   Wt (!) 43.9 kg (96 lb 12.8 oz)   LMP  (LMP Unknown)   SpO2 99%   BMI 19.33 kg/m²     Physical Exam  Vitals and nursing note reviewed.   Constitutional:       General: She is not in acute distress.     Appearance: She is not ill-appearing.   HENT:      Head: Normocephalic and atraumatic.      Right Ear: Tympanic membrane normal.      Left Ear: Tympanic membrane normal.      Mouth/Throat:      Pharynx: No posterior oropharyngeal erythema.   Eyes:      General: No scleral icterus.     Extraocular Movements: Extraocular movements intact.      Pupils: Pupils are equal, round, and reactive to light.   Cardiovascular:      Rate and Rhythm: Normal rate and regular rhythm.      Heart sounds: No murmur heard.  Pulmonary:      Breath sounds: Normal breath sounds. No wheezing or rhonchi.   Abdominal:      General: There is no distension.      Palpations: Abdomen is soft.      Tenderness: There is no abdominal tenderness.   Musculoskeletal:         General: Normal range of motion.      Cervical back: Normal range of motion.      Right lower leg: No edema.      Left lower leg: No edema.   Lymphadenopathy:      Cervical: No cervical adenopathy.   Skin:     General: Skin is warm and dry.   Neurological:      Mental Status: She is alert and oriented to person, place, and time. Mental status is at baseline.      Motor: No weakness.      Coordination: Coordination normal.   Psychiatric:         Mood and Affect: Mood normal.         Behavior: Behavior normal.         Thought Content: Thought content normal.         Judgment: Judgment normal.       Assessment/Plan   Problem List Items Addressed This Visit       Osteopenia    Hematuria     Other Visit Diagnoses       Encounter for general adult medical examination with abnormal findings    -  Primary    Elevated blood pressure reading        Relevant Orders    ECG 12 lead (Clinic Performed)    Dyslipidemia        " "Relevant Orders    ECG 12 lead (Clinic Performed)    Colon cancer screening              1) microscopic hematuria on recent urinalysis - repeat testing HPV gynecologic concerns and has follow-up with Dr. Lopez later this week     2) treatment for osteopenia - cpm - check DEXA later this year    3) elevated LDL, w/ low TC:HDL,  CT calcium scoring test less than 2 years ago revealed a score of \"0\" - ECG w/o evidence of LVH - recommend check home Bps - gave h/o on checking Bps     4) colon CA screening - will order Cologuard to be done after return from [place] in Spring      "

## 2025-01-06 NOTE — PROGRESS NOTES
"Suha Arellano is a 74 y.o. year old here for a Medicare Annual Wellness Visit     Health Risk Assessment  In general, health is:  \"great\" (would agree)      Concerns with balance:  no     Concerns with teeth or dentures:  no      Concerns with sexual function:  no noted      Felt anxious, stressed, angry, irritable, lonely, isolated, or had thoughts of hurting themself:  no     Has little interest or pleasure in doing things:  no      Bothered by feeling down, depressed, or hopeless:  no      Needs help with grocery shopping, cooking, housework, bathing, grooming, dressing, eating, sitting or standing, walking, using the toilet, handling finances, taking medications, using the telephone, or driving:  no     Following safety precautions in the home environment and vehicle: removed throw rugs from floors, installed grab bars in the bathroom, handrails in stairwells, having adequate lighting, wearing seatbelt at all times?:  no     Smokes cigarettes, vapes, or chew tobacco:  no     Eats healthy foods including fruits, vegetables, whole grains, and fiber-rich foods:  no     Number of days per week engages in exercise:  pretty much daily      Average alcohol consumption: 0        Current Providers  Specialists: I have reviewed specialist-related care of the patient in the medical record.  DDS (Pao White)  Gyn (Jessica)  Eye (Bairon)     Medical/Family history review  Reviewed and updated problem list, medical/surgical/family/social history, medications, and allergies.     Opioid use review  Patient use of opioids:  no            Depression screening  Depression Screening PHQ-2 Score    Implied 0         Depression screening tool completed and reviewed. Based on score and interview, patient is not at risk for depression. Screening tool discussed with patient, and I recommended no further intervention at this time.     Cognitive screening  Mini Cog Score:  5/5     Cognitive screening reviewed and plan:  none " "indicated     Functional Observation  Was the patient's timed Up & Go test unsteady or >= 12 seconds?  no     Advance Care Planning  End of Life planning discussed, including patient's advanced directive wishes:  yes    Vision and Hearing assessment  Visual acuity (required for Welcome to Medicare):  sees Dr Waddell  Hearing Evaluation:  not indicated    ====================================================    /76 (BP Location: Left arm, Patient Position: Sitting, BP Cuff Size: Child)   Pulse 87   Temp 36.8 °C (98.2 °F) (Temporal)   Ht 1.507 m (4' 11.33\")   Wt (!) 43.9 kg (96 lb 12.8 oz)   LMP  (LMP Unknown)   SpO2 99%   BMI 19.33 kg/m²     Chief Complaint   Patient presents with    Medicare Annual Wellness Visit Subsequent     HPI    1) microscopic hematuria on recent urinalysis - not aware of any prior history of this, no kidney stone history - she is not having UTI symptoms and no significant number of white blood cells nor bacteria noted on her urinalysis  No significant tobacco history, however she has been treated recently for some HPV gynecologic concerns and has follow-up with Dr. Lopez later this week     2) treatment for osteopenia -taking and tolerating the alendronate well -last DEXA scan showed some improvement relative to most previous testing -she will be due sometime later this year for repeat testing -her most recent vitamin D was in the 30s, she had been on an over-the-counter supplement but her level was creeping a bit too high and she stopped it     3) elevated LDL, w/ low TC:HDL, no sig Fhx early CAD noted and CT calcium scoring test less than 2 years ago revealed a score of \"0\" - she has not had any cardiac symptoms, but her blood pressure is elevated today (at least systolic-wise) -she does report feeling a bit stressed/anxious about her upcoming travel down to [place] she will be doing some highway driving with her daughter - denies chest pain, shortness of breath, dizziness, " "lightheadedness, hand or foot swelling that is new or different.   Doing well with physical activity.  In the late 90s she had a stress test done at the [facility], but no ECG copy available    4) Prevention:  Breast cancer screening: Plans to get on her return from [place] in spring  Bone density screening: Can assist with scheduling for this spring as well  Colon cancer screening: will order Cologuard for when patient returns  Skin cancer screening:; Does see Dr Hernandez     Review of Systems  Essentially noncontributory otherwise    Objective   /76 (BP Location: Left arm, Patient Position: Sitting, BP Cuff Size: Child)   Pulse 87   Temp 36.8 °C (98.2 °F) (Temporal)   Ht 1.507 m (4' 11.33\")   Wt (!) 43.9 kg (96 lb 12.8 oz)   LMP  (LMP Unknown)   SpO2 99%   BMI 19.33 kg/m²     Physical Exam  Vitals and nursing note reviewed.   Constitutional:       General: She is not in acute distress.     Appearance: She is not ill-appearing.   HENT:      Head: Normocephalic and atraumatic.      Right Ear: Tympanic membrane normal.      Left Ear: Tympanic membrane normal.      Mouth/Throat:      Pharynx: No posterior oropharyngeal erythema.   Eyes:      General: No scleral icterus.     Extraocular Movements: Extraocular movements intact.      Pupils: Pupils are equal, round, and reactive to light.   Cardiovascular:      Rate and Rhythm: Normal rate and regular rhythm.      Heart sounds: No murmur heard.  Pulmonary:      Breath sounds: Normal breath sounds. No wheezing or rhonchi.   Abdominal:      General: There is no distension.      Palpations: Abdomen is soft.      Tenderness: There is no abdominal tenderness.   Musculoskeletal:         General: Normal range of motion.      Cervical back: Normal range of motion.      Right lower leg: No edema.      Left lower leg: No edema.   Lymphadenopathy:      Cervical: No cervical adenopathy.   Skin:     General: Skin is warm and dry.   Neurological:      Mental Status: She " "is alert and oriented to person, place, and time. Mental status is at baseline.      Motor: No weakness.      Coordination: Coordination normal.   Psychiatric:         Mood and Affect: Mood normal.         Behavior: Behavior normal.         Thought Content: Thought content normal.         Judgment: Judgment normal.       Assessment/Plan   Problem List Items Addressed This Visit       Osteopenia    Hematuria     Other Visit Diagnoses       Encounter for general adult medical examination with abnormal findings    -  Primary    Elevated blood pressure reading        Relevant Orders    ECG 12 lead (Clinic Performed)    Dyslipidemia        Relevant Orders    ECG 12 lead (Clinic Performed)    Colon cancer screening              1) microscopic hematuria on recent urinalysis - repeat testing HPV gynecologic concerns and has follow-up with Dr. Lopez later this week     2) treatment for osteopenia - cpm - check DEXA later this year    3) elevated LDL, w/ low TC:HDL,  CT calcium scoring test less than 2 years ago revealed a score of \"0\" - ECG w/o evidence of LVH - recommend check home Bps - gave h/o on checking Bps     4) colon CA screening - will order Cologuard to be done after return from [place] in Spring "

## 2025-01-07 ENCOUNTER — TELEPHONE (OUTPATIENT)
Dept: PRIMARY CARE | Facility: CLINIC | Age: 75
End: 2025-01-07
Payer: MEDICARE

## 2025-01-07 DIAGNOSIS — R82.998 CALCIUM OXALATE CRYSTALS IN URINE: Primary | ICD-10-CM

## 2025-01-07 NOTE — TELEPHONE ENCOUNTER
Patient states you had left her a VM and she is returning your call with some questions - hoping to speak with you at your convenience.

## 2025-01-08 ENCOUNTER — APPOINTMENT (OUTPATIENT)
Dept: OBSTETRICS AND GYNECOLOGY | Facility: CLINIC | Age: 75
End: 2025-01-08
Payer: MEDICARE

## 2025-01-08 ENCOUNTER — LAB (OUTPATIENT)
Dept: LAB | Facility: LAB | Age: 75
End: 2025-01-08
Payer: MEDICARE

## 2025-01-08 VITALS
SYSTOLIC BLOOD PRESSURE: 155 MMHG | WEIGHT: 96 LBS | HEIGHT: 60 IN | DIASTOLIC BLOOD PRESSURE: 76 MMHG | BODY MASS INDEX: 18.85 KG/M2

## 2025-01-08 DIAGNOSIS — R82.998 CALCIUM OXALATE CRYSTALS IN URINE: ICD-10-CM

## 2025-01-08 DIAGNOSIS — N95.2 VAGINAL ATROPHY: Primary | ICD-10-CM

## 2025-01-08 LAB — PTH-INTACT SERPL-MCNC: 72.8 PG/ML (ref 18.5–88)

## 2025-01-08 PROCEDURE — 83970 ASSAY OF PARATHORMONE: CPT

## 2025-01-08 RX ORDER — ESTRADIOL 0.1 MG/G
2 CREAM VAGINAL 2 TIMES WEEKLY
Qty: 42.5 G | Refills: 3 | Status: SHIPPED | OUTPATIENT
Start: 2025-01-09 | End: 2026-01-09

## 2025-01-08 ASSESSMENT — ENCOUNTER SYMPTOMS
DIARRHEA: 0
UNEXPECTED WEIGHT CHANGE: 0
NAUSEA: 0
DYSURIA: 0
FLANK PAIN: 0
SHORTNESS OF BREATH: 0
ABDOMINAL DISTENTION: 0
FREQUENCY: 0
COLOR CHANGE: 0
VOMITING: 0
HEMATURIA: 0
CONSTIPATION: 0
APPETITE CHANGE: 0
ABDOMINAL PAIN: 0
FATIGUE: 0
BACK PAIN: 0
FEVER: 0
SLEEP DISTURBANCE: 0
BLOOD IN STOOL: 0
CHILLS: 0

## 2025-01-08 ASSESSMENT — PAIN SCALES - GENERAL: PAINLEVEL_OUTOF10: 0-NO PAIN

## 2025-01-08 NOTE — PROGRESS NOTES
Called and reviewed findings as michaela in  message - Ca++ oxalate xyls - remote hx of kidney stones - will check PTH

## 2025-01-08 NOTE — PROGRESS NOTES
Suah Arellano is a 74 y.o.  here for follow up on LEEP and subsequent vaginal infection    Pt reports no further vaginal odor. She has no complaints today.  She is leaving for [place] on Saturday and will return mid-April.    Past medical and surgical history reviewed.     Obstetric History  OB History    Para Term  AB Living   3 3 3     3   SAB IAB Ectopic Multiple Live Births                  # Outcome Date GA Lbr Alan/2nd Weight Sex Type Anes PTL Lv   3 Term            2 Term            1 Term                 Past Medical History  She has a past medical history of Abnormal Pap smear of cervix, Cervical dysplasia, Osteopenia, Osteoporosis, and PONV (postoperative nausea and vomiting).    She has no past medical history of ADD (attention deficit disorder), ADHD (attention deficit hyperactivity disorder), FABIANA (acute kidney injury) (CMS-HCC), Anxiety, Arthritis, Autism ([facility]), Autoimmune disorder (Multi), Biliary disease, Bipolar disorder, Bladder cancer (Multi), BPH (benign prostatic hyperplasia), Cancer of neurologic system ([facility]), Celiac disease ([facility]), Cerebral aneurysm ([facility]), Cerebral palsy, Cerebral vascular accident (Multi), Cervical disc disease, Cholelithiasis, Chronic headaches, Chronic kidney disease, Chronic pain disorder, Cirrhosis (Multi), CKD (chronic kidney disease), Cognitive decline, Cognitive disorder, Colon polyp, Colorectal cancer (Multi), Crohn's disease (Multi), Dementia, Depression, Diverticulosis, Dizziness, Dysfunctional uterine bleeding, Dysphagia, Endometrial cancer (Multi), Esophageal cancer (Multi), Esophageal disease, ESRD (end stage renal disease) (Multi), Fibroid, Fibromyalgia, primary, Fractures, Gastric cancer (Multi), Gender dysphoria, GERD (gastroesophageal reflux disease), Gestational diabetes, Gestational hypertension ([facility]), GI (gastrointestinal bleed), Hemodialysis status (CMS-HCC), Hiatal hernia, History of peritoneal dialysis, HIV disease  (Multi), Hydrocephalus, Immunocompromised, Intellectual disability, Irritable bowel syndrome, Joint pain, Liver disease, Lumbar disc disease, Lupus, Mastocytosis, MS (multiple sclerosis) (Multi), Muscular dystrophy (Multi), Myasthenia gravis, Myoneural disorder (Multi), Myotonia, POTTER (nonalcoholic steatohepatitis), Nephrolithiasis, Neuromuscular disorder (Multi), Ovarian cancer (Multi), Pancreatic cancer (Multi), Pancreatitis ([facility]-HCC), Pelvic mass, Peptic ulcer disease, Peripheral neuropathy, Personal history of other mental and behavioral disorders, Polycystic ovarian disease, Pregnant ([facility]HS-HCC), Prematurity ([facility]HS-HCC), Prostate cancer (Multi), PTSD (post-traumatic stress disorder), Pyelonephritis, Renal cancer (Multi), Renal disease due to hypertension, Schizophrenia, Scoliosis, Seizure disorder (Multi), Spinal stenosis, Substance addiction (Multi), Thoracic spinal cord injury (Multi), TIA (transient ischemic attack), Ulcerative colitis, Urinary tract infection, Uterine cancer (Multi), or Vertigo.    Surgical History  She has a past surgical history that includes Breast biopsy; Tonsillectomy (08/27/2014); Adenoidectomy; Colonoscopy; Colposcopy; Cataract extraction; and Foot fracture surgery.     Social History  She reports that she has never smoked. She has been exposed to tobacco smoke. She has never used smokeless tobacco. She reports that she does not drink alcohol and does not use drugs.    Family History  Family History   Problem Relation Name Age of Onset    Cancer Sister Peggy Person          /74 (BP Location: Right arm, Patient Position: Sitting)   Ht 1.524 m (5')   Wt (!) 43.5 kg (96 lb)   LMP  (LMP Unknown)   BMI 18.75 kg/m²   No LMP recorded (lmp unknown). Patient is postmenopausal.    Review of Systems   Constitutional:  Negative for appetite change, chills, fatigue, fever and unexpected weight change.   Respiratory:  Negative for shortness of breath.    Cardiovascular:  Negative for chest  pain.   Gastrointestinal:  Negative for abdominal distention, abdominal pain, blood in stool, constipation, diarrhea, nausea and vomiting.   Endocrine: Negative for cold intolerance and heat intolerance.   Genitourinary:  Negative for dyspareunia, dysuria, flank pain, frequency, genital sores, hematuria, menstrual problem, pelvic pain, urgency, vaginal bleeding, vaginal discharge and vaginal pain.   Musculoskeletal:  Negative for back pain.   Skin:  Negative for color change.   Psychiatric/Behavioral:  Negative for sleep disturbance.        Physical Exam  Constitutional:       Appearance: Normal appearance.   Genitourinary:     General: Normal vulva.      Vagina: Normal.      Cervix: Normal.      Uterus: Normal.       Adnexa: Right adnexa normal and left adnexa normal.      Comments: Some scarring noted of cervix to posterior vaginal wall ; no evidence of infection today   Skin:     General: Skin is warm and dry.   Neurological:      Mental Status: She is alert.   Psychiatric:         Mood and Affect: Mood normal.         Behavior: Behavior normal.           Assessment and Plan:    Infection s/p LEEP  No evidence of infection today - infection has been cleared with 2nd round of abx  Some scarring/agglutination noted at surgical site - recommend estradiol cream 2 x weekly  and patient agreeable, rx sent

## 2025-05-12 ENCOUNTER — HOSPITAL ENCOUNTER (OUTPATIENT)
Dept: RADIOLOGY | Facility: CLINIC | Age: 75
Discharge: HOME | End: 2025-05-12
Payer: MEDICARE

## 2025-05-12 VITALS — BODY MASS INDEX: 18.83 KG/M2 | WEIGHT: 95.9 LBS | HEIGHT: 60 IN

## 2025-05-12 DIAGNOSIS — Z12.31 BREAST CANCER SCREENING BY MAMMOGRAM: ICD-10-CM

## 2025-05-12 PROCEDURE — 77063 BREAST TOMOSYNTHESIS BI: CPT | Performed by: RADIOLOGY

## 2025-05-12 PROCEDURE — 77067 SCR MAMMO BI INCL CAD: CPT

## 2025-05-12 PROCEDURE — 77067 SCR MAMMO BI INCL CAD: CPT | Performed by: RADIOLOGY

## 2025-05-13 ENCOUNTER — HOSPITAL ENCOUNTER (OUTPATIENT)
Dept: RADIOLOGY | Facility: CLINIC | Age: 75
Discharge: HOME | End: 2025-05-13
Payer: MEDICARE

## 2025-05-13 DIAGNOSIS — M85.89 OSTEOPENIA OF MULTIPLE SITES: ICD-10-CM

## 2025-05-13 PROCEDURE — 77080 DXA BONE DENSITY AXIAL: CPT

## 2025-05-13 PROCEDURE — 77080 DXA BONE DENSITY AXIAL: CPT | Performed by: RADIOLOGY

## 2025-05-23 DIAGNOSIS — Z12.11 COLON CANCER SCREENING: Primary | ICD-10-CM

## 2025-06-27 LAB — NONINV COLON CA DNA+OCC BLD SCRN STL QL: NEGATIVE

## (undated) DEVICE — ELECTRODE BALL, 5MM

## (undated) DEVICE — GOWN, ASTOUND, L

## (undated) DEVICE — CONTAINER, SPECIMEN, 120 ML, STERILE

## (undated) DEVICE — PAD, GROUNDING, ELECTROSURGICAL, W/9 FT CABLE, POLYHESIVE II, ADULT, LF

## (undated) DEVICE — SUTURE, SOFSILK, 2-0, 18 IN, C15, BLACK

## (undated) DEVICE — SOLUTION, SCRUB EXIDINE, 4% CHG, 4 OZ

## (undated) DEVICE — SOLUTION, IRRIGATION, SODIUM CHLORIDE 0.9%, 1000 ML, POUR BOTTLE

## (undated) DEVICE — TUBING 10'' W/ WAND STRYKER

## (undated) DEVICE — GLOVE, SURGICAL, PROTEXIS PI W/NEU-THERA, 6.0, PF, LF

## (undated) DEVICE — ELECTRODE, LOOP T-BAR W10 D10

## (undated) DEVICE — Device

## (undated) DEVICE — GLOVE, SURGICAL, PROTEXIS PI W/NEU-THERA, 6.5, PF, LF